# Patient Record
Sex: FEMALE | Race: WHITE | NOT HISPANIC OR LATINO | Employment: OTHER | URBAN - METROPOLITAN AREA
[De-identification: names, ages, dates, MRNs, and addresses within clinical notes are randomized per-mention and may not be internally consistent; named-entity substitution may affect disease eponyms.]

---

## 2023-02-09 ENCOUNTER — LAB (OUTPATIENT)
Dept: LAB | Facility: HOSPITAL | Age: 88
End: 2023-02-09
Payer: MEDICARE

## 2023-02-09 ENCOUNTER — OFFICE VISIT (OUTPATIENT)
Dept: INTERNAL MEDICINE | Facility: CLINIC | Age: 88
End: 2023-02-09
Payer: MEDICARE

## 2023-02-09 VITALS
HEIGHT: 65 IN | BODY MASS INDEX: 23.32 KG/M2 | WEIGHT: 140 LBS | SYSTOLIC BLOOD PRESSURE: 130 MMHG | HEART RATE: 70 BPM | DIASTOLIC BLOOD PRESSURE: 78 MMHG | TEMPERATURE: 96.1 F | OXYGEN SATURATION: 99 %

## 2023-02-09 DIAGNOSIS — R06.02 SHORTNESS OF BREATH: Primary | ICD-10-CM

## 2023-02-09 DIAGNOSIS — R60.0 PEDAL EDEMA: ICD-10-CM

## 2023-02-09 DIAGNOSIS — N39.41 URGE INCONTINENCE OF URINE: ICD-10-CM

## 2023-02-09 DIAGNOSIS — E03.9 ACQUIRED HYPOTHYROIDISM: ICD-10-CM

## 2023-02-09 DIAGNOSIS — H61.21 HEARING LOSS SECONDARY TO CERUMEN IMPACTION, RIGHT: ICD-10-CM

## 2023-02-09 DIAGNOSIS — F03.A0 MILD DEMENTIA WITHOUT BEHAVIORAL DISTURBANCE, PSYCHOTIC DISTURBANCE, MOOD DISTURBANCE, OR ANXIETY, UNSPECIFIED DEMENTIA TYPE: ICD-10-CM

## 2023-02-09 DIAGNOSIS — L98.9 NON-HEALING SKIN LESION OF NOSE: ICD-10-CM

## 2023-02-09 LAB
BASOPHILS # BLD AUTO: 0.06 10*3/MM3 (ref 0–0.2)
BASOPHILS NFR BLD AUTO: 0.7 % (ref 0–1.5)
DEPRECATED RDW RBC AUTO: 49.9 FL (ref 37–54)
EOSINOPHIL # BLD AUTO: 0.16 10*3/MM3 (ref 0–0.4)
EOSINOPHIL NFR BLD AUTO: 1.8 % (ref 0.3–6.2)
ERYTHROCYTE [DISTWIDTH] IN BLOOD BY AUTOMATED COUNT: 14.5 % (ref 12.3–15.4)
HCT VFR BLD AUTO: 35.9 % (ref 34–46.6)
HGB BLD-MCNC: 11.9 G/DL (ref 12–15.9)
IMM GRANULOCYTES # BLD AUTO: 0.06 10*3/MM3 (ref 0–0.05)
IMM GRANULOCYTES NFR BLD AUTO: 0.7 % (ref 0–0.5)
LYMPHOCYTES # BLD AUTO: 1.23 10*3/MM3 (ref 0.7–3.1)
LYMPHOCYTES NFR BLD AUTO: 14 % (ref 19.6–45.3)
MCH RBC QN AUTO: 31.4 PG (ref 26.6–33)
MCHC RBC AUTO-ENTMCNC: 33.1 G/DL (ref 31.5–35.7)
MCV RBC AUTO: 94.7 FL (ref 79–97)
MONOCYTES # BLD AUTO: 0.95 10*3/MM3 (ref 0.1–0.9)
MONOCYTES NFR BLD AUTO: 10.8 % (ref 5–12)
NEUTROPHILS NFR BLD AUTO: 6.3 10*3/MM3 (ref 1.7–7)
NEUTROPHILS NFR BLD AUTO: 72 % (ref 42.7–76)
NRBC BLD AUTO-RTO: 0 /100 WBC (ref 0–0.2)
PLATELET # BLD AUTO: 246 10*3/MM3 (ref 140–450)
PMV BLD AUTO: 10.4 FL (ref 6–12)
RBC # BLD AUTO: 3.79 10*6/MM3 (ref 3.77–5.28)
WBC NRBC COR # BLD: 8.76 10*3/MM3 (ref 3.4–10.8)

## 2023-02-09 PROCEDURE — 86376 MICROSOMAL ANTIBODY EACH: CPT | Performed by: NURSE PRACTITIONER

## 2023-02-09 PROCEDURE — 69209 REMOVE IMPACTED EAR WAX UNI: CPT | Performed by: NURSE PRACTITIONER

## 2023-02-09 PROCEDURE — 99204 OFFICE O/P NEW MOD 45 MIN: CPT | Performed by: NURSE PRACTITIONER

## 2023-02-09 PROCEDURE — 86800 THYROGLOBULIN ANTIBODY: CPT | Performed by: NURSE PRACTITIONER

## 2023-02-09 PROCEDURE — 83880 ASSAY OF NATRIURETIC PEPTIDE: CPT | Performed by: NURSE PRACTITIONER

## 2023-02-09 PROCEDURE — 80053 COMPREHEN METABOLIC PANEL: CPT | Performed by: NURSE PRACTITIONER

## 2023-02-09 PROCEDURE — 84436 ASSAY OF TOTAL THYROXINE: CPT | Performed by: NURSE PRACTITIONER

## 2023-02-09 PROCEDURE — 84443 ASSAY THYROID STIM HORMONE: CPT | Performed by: NURSE PRACTITIONER

## 2023-02-09 PROCEDURE — 85025 COMPLETE CBC W/AUTO DIFF WBC: CPT | Performed by: NURSE PRACTITIONER

## 2023-02-09 PROCEDURE — 36415 COLL VENOUS BLD VENIPUNCTURE: CPT | Performed by: NURSE PRACTITIONER

## 2023-02-09 RX ORDER — CHOLECALCIFEROL (VITAMIN D3) 1250 MCG
CAPSULE ORAL
COMMUNITY
End: 2023-02-09

## 2023-02-09 RX ORDER — LEVOTHYROXINE SODIUM 112 UG/1
112 TABLET ORAL DAILY
Qty: 90 TABLET | Refills: 2 | Status: SHIPPED | OUTPATIENT
Start: 2023-02-09

## 2023-02-09 RX ORDER — MEMANTINE HYDROCHLORIDE 5 MG/1
5 TABLET ORAL 2 TIMES DAILY
Qty: 180 TABLET | Refills: 2 | Status: SHIPPED | OUTPATIENT
Start: 2023-02-09

## 2023-02-09 RX ORDER — LEVOTHYROXINE SODIUM 112 UG/1
112 TABLET ORAL DAILY
COMMUNITY
Start: 2022-11-18 | End: 2023-02-09 | Stop reason: SDUPTHER

## 2023-02-09 RX ORDER — MEMANTINE HYDROCHLORIDE 5 MG/1
5 TABLET ORAL 2 TIMES DAILY
COMMUNITY
End: 2023-02-09 | Stop reason: SDUPTHER

## 2023-02-09 RX ORDER — VERAPAMIL HYDROCHLORIDE 240 MG/1
240 TABLET, FILM COATED, EXTENDED RELEASE ORAL DAILY
COMMUNITY
End: 2023-02-09 | Stop reason: SDUPTHER

## 2023-02-09 RX ORDER — CHOLECALCIFEROL (VITAMIN D3) 125 MCG
5 CAPSULE ORAL 2 TIMES DAILY
COMMUNITY
End: 2023-02-09

## 2023-02-09 RX ORDER — MELATONIN
1000 DAILY
COMMUNITY

## 2023-02-09 RX ORDER — MELATONIN 10 MG
10 CAPSULE ORAL
COMMUNITY

## 2023-02-09 RX ORDER — VERAPAMIL HYDROCHLORIDE 240 MG/1
240 TABLET, FILM COATED, EXTENDED RELEASE ORAL DAILY
Qty: 90 TABLET | Refills: 2 | Status: SHIPPED | OUTPATIENT
Start: 2023-02-09

## 2023-02-09 RX ORDER — OXYBUTYNIN CHLORIDE 10 MG/1
10 TABLET, EXTENDED RELEASE ORAL DAILY
Qty: 30 TABLET | Refills: 5 | Status: SHIPPED | OUTPATIENT
Start: 2023-02-09

## 2023-02-09 RX ORDER — OMEPRAZOLE 20 MG/1
20 CAPSULE, DELAYED RELEASE ORAL DAILY
COMMUNITY
End: 2023-02-09 | Stop reason: SDUPTHER

## 2023-02-09 RX ORDER — OMEPRAZOLE 20 MG/1
20 CAPSULE, DELAYED RELEASE ORAL DAILY
Qty: 90 CAPSULE | Refills: 2 | Status: SHIPPED | OUTPATIENT
Start: 2023-02-09

## 2023-02-09 RX ORDER — SERTRALINE HYDROCHLORIDE 100 MG/1
100 TABLET, FILM COATED ORAL DAILY
Qty: 90 TABLET | Refills: 2 | Status: SHIPPED | OUTPATIENT
Start: 2023-02-09

## 2023-02-09 RX ORDER — SERTRALINE HYDROCHLORIDE 100 MG/1
100 TABLET, FILM COATED ORAL DAILY
COMMUNITY
Start: 2023-01-01 | End: 2023-02-09 | Stop reason: SDUPTHER

## 2023-02-09 RX ORDER — MEMANTINE HYDROCHLORIDE 5 MG/1
5 TABLET ORAL 2 TIMES DAILY
COMMUNITY
Start: 2023-01-30 | End: 2023-02-09 | Stop reason: SDUPTHER

## 2023-02-09 NOTE — PROGRESS NOTES
"Subjective   Chief Complaint   Patient presents with   • Establish Care       Jaqui Matute is a 89 y.o. female here today as a new patient to establish care.  Prior PCP was Dr Xie.  Pt is needing medication refills.   Pt's daughter is with pt.  She states that pt gets a little more \"winded\" when she ambulates.  She has \"sitters\" during the day and they can tell a difference with her breathing when she gets up and moves around.  Unhealing skin lesion on the right side of her nose x1 year.  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  Her daughter also feels like her hearing is decreased.  She feels that she could have a wax buildup.    Urinary leakage.  Has to wear a depends.    Review of Systems   Constitutional: Negative for activity change, appetite change and fatigue.   HENT: Positive for hearing loss. Negative for congestion and ear pain.    Respiratory: Positive for cough and shortness of breath.    Cardiovascular: Negative for chest pain and leg swelling.   Gastrointestinal: Negative for abdominal pain.   Skin: Positive for skin lesions (nose).   Neurological: Positive for memory problem. Negative for dizziness, weakness and confusion.   Psychiatric/Behavioral: Negative for behavioral problems and decreased concentration.       Past Medical History:   Diagnosis Date   • Anemia    • Cancer (HCC)    • Colon polyp    • Dementia (HCC)    • Diverticulosis    • GERD (gastroesophageal reflux disease)    • Headache    • Hyperlipidemia    • Hypertension    • Hypothyroidism      Past Surgical History:   Procedure Laterality Date   • FEMUR FRACTURE SURGERY     • HYSTERECTOMY     • MASTECTOMY Left    • REPLACEMENT TOTAL KNEE     • THYROIDECTOMY     • TOTAL HIP ARTHROPLASTY       Family History   Problem Relation Age of Onset   • Cancer Mother    • Arthritis Mother    • Arthritis Father    • Lung cancer Sister    • Cancer Son    • Diabetes Son      Social History     Tobacco Use   Smoking Status Former   • Types: Cigarettes " "  Smokeless Tobacco Never      Social History     Substance and Sexual Activity   Alcohol Use Not Currently      Current Outpatient Medications on File Prior to Visit   Medication Sig   • cholecalciferol (VITAMIN D3) 25 MCG (1000 UT) tablet Take 1,000 Units by mouth Daily.   • Melatonin 10 MG capsule Take 10 mg by mouth.   • Multiple Vitamins-Minerals (MULTIVITAMIN ADULT EXTRA C PO) multivitamin   • [DISCONTINUED] Cholecalciferol (Vitamin D3) 1.25 MG (08781 UT) capsule Vitamin D3   1000 mg   • [DISCONTINUED] levothyroxine (SYNTHROID, LEVOTHROID) 112 MCG tablet Take 112 mcg by mouth Daily.   • [DISCONTINUED] melatonin 5 MG tablet tablet Take 5 mg by mouth 2 (Two) Times a Day.   • [DISCONTINUED] memantine (NAMENDA) 5 MG tablet Take 5 mg by mouth 2 (Two) Times a Day.   • [DISCONTINUED] memantine (NAMENDA) 5 MG tablet Take 5 mg by mouth 2 (Two) Times a Day.   • [DISCONTINUED] omeprazole (priLOSEC) 20 MG capsule Take 20 mg by mouth Daily.   • [DISCONTINUED] sertraline (ZOLOFT) 100 MG tablet Take 100 mg by mouth Daily.   • [DISCONTINUED] verapamil SR (CALAN-SR) 240 MG CR tablet Take 240 mg by mouth Daily.     No current facility-administered medications on file prior to visit.     No Known Allergies    Objective   Vitals:    02/09/23 1328   BP: 130/78   BP Location: Left arm   Patient Position: Sitting   Pulse: 70   Temp: 96.1 °F (35.6 °C)   SpO2: 99%   Weight: 63.5 kg (140 lb)   Height: 165.1 cm (65\")     Body mass index is 23.3 kg/m².     Physical Exam  Vitals and nursing note reviewed.   HENT:      Head: Normocephalic.   Eyes:      Pupils: Pupils are equal, round, and reactive to light.   Cardiovascular:      Rate and Rhythm: Normal rate and regular rhythm.      Pulses: Normal pulses.      Heart sounds: Normal heart sounds. No murmur heard.  Pulmonary:      Effort: Pulmonary effort is normal. No respiratory distress.      Breath sounds: Normal breath sounds. No wheezing or rhonchi.   Musculoskeletal:      Right lower " le+ Edema present.      Left lower le+ Edema present.   Skin:     General: Skin is warm and dry.      Capillary Refill: Capillary refill takes less than 2 seconds.      Comments: Scabbed lesion noted on the right side of nose, no redness or drainage   Neurological:      General: No focal deficit present.      Mental Status: She is alert and oriented to person, place, and time.      Gait: Gait is intact.   Psychiatric:         Attention and Perception: Attention normal.         Mood and Affect: Mood normal.         Behavior: Behavior normal.     Ear Cerumen Removal    Date/Time: 2023 4:04 PM  Performed by: Amrit Arredondo APRN  Authorized by: Amrit Arredondo APRN   Consent: Verbal consent obtained.  Risks and benefits: risks, benefits and alternatives were discussed  Consent given by: patient  Location details: right ear  Patient tolerance: patient tolerated the procedure well with no immediate complications  Comments: Cerumen was unable to be removed due to it being too hard  Procedure type: irrigation         Assessment & Plan   Problem List Items Addressed This Visit    None  Visit Diagnoses     Shortness of breath    -  Primary    Relevant Orders    CBC w AUTO Differential    Comprehensive Metabolic Panel    XR Chest PA & Lateral    proBNP    Acquired hypothyroidism        Relevant Medications    levothyroxine (SYNTHROID, LEVOTHROID) 112 MCG tablet    Other Relevant Orders    TSH    T4    Thyroid Antibodies    Pedal edema        Urge incontinence of urine        Relevant Medications    oxybutynin XL (Ditropan XL) 10 MG 24 hr tablet    Hearing loss secondary to cerumen impaction, right        Relevant Orders    Ear Cerumen Removal    Non-healing skin lesion of nose        Mild dementia without behavioral disturbance, psychotic disturbance, mood disturbance, or anxiety, unspecified dementia type        Relevant Medications    sertraline (ZOLOFT) 100 MG tablet    memantine (NAMENDA) 5 MG tablet          Labs and CXR for SOB  Daughter does not want pt referred for skin lesion, likely a form of cancer  Try Oxybutynin for urge incontinence  Unable to remove wax, d/w daughter to use debrox daily for 5 days and flush ear with warm water  Does not wish to schedule AWV        Current Outpatient Medications:   •  cholecalciferol (VITAMIN D3) 25 MCG (1000 UT) tablet, Take 1,000 Units by mouth Daily., Disp: , Rfl:   •  levothyroxine (SYNTHROID, LEVOTHROID) 112 MCG tablet, Take 1 tablet by mouth Daily., Disp: 90 tablet, Rfl: 2  •  Melatonin 10 MG capsule, Take 10 mg by mouth., Disp: , Rfl:   •  memantine (NAMENDA) 5 MG tablet, Take 1 tablet by mouth 2 (Two) Times a Day., Disp: 180 tablet, Rfl: 2  •  Multiple Vitamins-Minerals (MULTIVITAMIN ADULT EXTRA C PO), multivitamin, Disp: , Rfl:   •  omeprazole (priLOSEC) 20 MG capsule, Take 1 capsule by mouth Daily., Disp: 90 capsule, Rfl: 2  •  sertraline (ZOLOFT) 100 MG tablet, Take 1 tablet by mouth Daily., Disp: 90 tablet, Rfl: 2  •  verapamil SR (CALAN-SR) 240 MG CR tablet, Take 1 tablet by mouth Daily., Disp: 90 tablet, Rfl: 2  •  oxybutynin XL (Ditropan XL) 10 MG 24 hr tablet, Take 1 tablet by mouth Daily., Disp: 30 tablet, Rfl: 5       Plan of care reviewed with the patient at the conclusion of today's visit.  Education was provided regarding diagnosis, management, and any prescribed or recommended OTC medications.  Patient verbalized understanding of and agreement with management plan.      Return in about 1 year (around 2/9/2024) for HTN, Hypothyroidism.         MAGI Grady

## 2023-02-10 LAB
ALBUMIN SERPL-MCNC: 4.3 G/DL (ref 3.5–5.2)
ALBUMIN/GLOB SERPL: 1.7 G/DL
ALP SERPL-CCNC: 119 U/L (ref 39–117)
ALT SERPL W P-5'-P-CCNC: 13 U/L (ref 1–33)
ANION GAP SERPL CALCULATED.3IONS-SCNC: 6 MMOL/L (ref 5–15)
AST SERPL-CCNC: 19 U/L (ref 1–32)
BILIRUB SERPL-MCNC: 0.3 MG/DL (ref 0–1.2)
BUN SERPL-MCNC: 15 MG/DL (ref 8–23)
BUN/CREAT SERPL: 16.9 (ref 7–25)
CALCIUM SPEC-SCNC: 8.8 MG/DL (ref 8.2–9.6)
CHLORIDE SERPL-SCNC: 99 MMOL/L (ref 98–107)
CO2 SERPL-SCNC: 29 MMOL/L (ref 22–29)
CREAT SERPL-MCNC: 0.89 MG/DL (ref 0.57–1)
EGFRCR SERPLBLD CKD-EPI 2021: 61.3 ML/MIN/1.73
GLOBULIN UR ELPH-MCNC: 2.5 GM/DL
GLUCOSE SERPL-MCNC: 88 MG/DL (ref 65–99)
NT-PROBNP SERPL-MCNC: 363 PG/ML (ref 0–1800)
POTASSIUM SERPL-SCNC: 4.4 MMOL/L (ref 3.5–5.2)
PROT SERPL-MCNC: 6.8 G/DL (ref 6–8.5)
SODIUM SERPL-SCNC: 134 MMOL/L (ref 136–145)
T4 SERPL-MCNC: 7.97 MCG/DL (ref 4.5–11.7)
TSH SERPL DL<=0.05 MIU/L-ACNC: 6.3 UIU/ML (ref 0.27–4.2)

## 2023-02-13 LAB
THYROGLOB AB SERPL-ACNC: <1 IU/ML (ref 0–0.9)
THYROPEROXIDASE AB SERPL-ACNC: 10 IU/ML (ref 0–34)

## 2023-02-21 ENCOUNTER — HOSPITAL ENCOUNTER (OUTPATIENT)
Dept: GENERAL RADIOLOGY | Facility: HOSPITAL | Age: 88
Discharge: HOME | End: 2023-02-21
Admitting: NURSE PRACTITIONER
Payer: MEDICARE

## 2023-02-21 DIAGNOSIS — R06.02 SHORTNESS OF BREATH: ICD-10-CM

## 2023-02-21 PROCEDURE — 71046 X-RAY EXAM CHEST 2 VIEWS: CPT

## 2023-02-22 DIAGNOSIS — R19.02 LEFT UPPER QUADRANT ABDOMINAL MASS: Primary | ICD-10-CM

## 2023-02-22 DIAGNOSIS — R93.89 ABNORMAL CHEST X-RAY: ICD-10-CM

## 2023-02-24 ENCOUNTER — HOSPITAL ENCOUNTER (OUTPATIENT)
Dept: CT IMAGING | Facility: HOSPITAL | Age: 88
Discharge: HOME | End: 2023-02-24
Admitting: NURSE PRACTITIONER
Payer: MEDICARE

## 2023-02-24 DIAGNOSIS — R93.89 ABNORMAL CHEST X-RAY: ICD-10-CM

## 2023-02-24 DIAGNOSIS — R19.02 LEFT UPPER QUADRANT ABDOMINAL MASS: ICD-10-CM

## 2023-02-24 PROCEDURE — 74177 CT ABD & PELVIS W/CONTRAST: CPT

## 2023-02-24 PROCEDURE — 25510000001 IOPAMIDOL 61 % SOLUTION: Performed by: NURSE PRACTITIONER

## 2023-02-24 RX ADMIN — IOPAMIDOL 80 ML: 612 INJECTION, SOLUTION INTRAVENOUS at 10:48

## 2023-02-27 ENCOUNTER — TELEPHONE (OUTPATIENT)
Dept: INTERNAL MEDICINE | Facility: CLINIC | Age: 88
End: 2023-02-27

## 2023-02-27 NOTE — TELEPHONE ENCOUNTER
Caller: ZULMA DE LA GARZA    Relationship: Emergency Contact    Best call back number: [phone]    Caller requesting test results: ZULMA    What test was performed: CT SCAN    When was the test performed: 2/24/2023    Additional notes: PLEASE CALL PATIENT'S DAUGHTER TO DISCUSS THESE RESULTS AS SOON AS POSSIBLE. SHE STATED THAT IF SHE DOESN'T ANSWER, SHE IS FINE WITH GETTING A DETAILED VOICE MAIL.

## 2023-03-02 ENCOUNTER — TELEPHONE (OUTPATIENT)
Dept: INTERNAL MEDICINE | Facility: CLINIC | Age: 88
End: 2023-03-02

## 2023-03-02 DIAGNOSIS — M25.662 DECREASED RANGE OF MOTION (ROM) OF LEFT KNEE: Primary | ICD-10-CM

## 2023-03-02 NOTE — TELEPHONE ENCOUNTER
Caller: ZULMA DE LA GARZA    Relationship: Emergency Contact    Best call back number: [phone]    What is the medical concern/diagnosis: LEFT LEG ISSUES POST SURGERY ALMOST 2 YEARS AGO    What specialty or service is being requested: PHYSICAL THERAPY    What is the office location: [place]    Any additional details: PATIENT IS NOT STRAIGHTENING OUT LEFT LEG SINCE ARTIFICIAL KNEE SURGERY ALMOST 2 YEARS AGO AND ZULMA WOULD LIKE TO GET PHYSICAL THERAPY FOR PATIENT. PLEASE ADVISE

## 2023-03-13 ENCOUNTER — TREATMENT (OUTPATIENT)
Dept: PHYSICAL THERAPY | Facility: CLINIC | Age: 88
End: 2023-03-13
Payer: MEDICARE

## 2023-03-13 DIAGNOSIS — Z74.09 IMPAIRED FUNCTIONAL MOBILITY, BALANCE, GAIT, AND ENDURANCE: ICD-10-CM

## 2023-03-13 DIAGNOSIS — M25.562 CHRONIC PAIN OF LEFT KNEE: Primary | ICD-10-CM

## 2023-03-13 DIAGNOSIS — G89.29 CHRONIC PAIN OF LEFT KNEE: Primary | ICD-10-CM

## 2023-03-13 PROCEDURE — 97530 THERAPEUTIC ACTIVITIES: CPT | Performed by: PHYSICAL THERAPIST

## 2023-03-13 PROCEDURE — 97162 PT EVAL MOD COMPLEX 30 MIN: CPT | Performed by: PHYSICAL THERAPIST

## 2023-03-13 PROCEDURE — 97110 THERAPEUTIC EXERCISES: CPT | Performed by: PHYSICAL THERAPIST

## 2023-03-13 NOTE — PROGRESS NOTES
Physical Therapy Initial Evaluation and Plan of Care    [address]  [place] [address]    Patient: Jaqui Matute   : 1931  Diagnosis/ICD-10 Code:  Chronic pain of left knee [M25.562, G89.29]  Referring practitioner: MAGI Grady  Date of Initial Visit: 3/13/2023  Today's Date: 3/14/2023  Patient seen for 1 sessions         Visit Diagnoses:    ICD-10-CM ICD-9-CM   1. Chronic pain of left knee  M25.562 719.46    G89.29 338.29   2. Impaired functional mobility, balance, gait, and endurance  Z74.09 V49.89       Subjective Questionnaire: LEFS: 25      Subjective Evaluation    History of Present Illness  Date of onset: 3/14/2022  Mechanism of injury: Pt states she had left TKA performed about 2 years ago at [facility], Dr. Summers. Denies any complications, reports she was able to regain her ROM. States she hasn't been as active recently, noted decline in knee mobility. States she cannot extend her knee into full extension. States uses rolling walker for ambulation at home. Indep with dressing, bathing. Chair in tub, has assistance with transfer.   Denies any buckling, denies falls. Uses icy hot as needed. Pt has dementia, has son present to assist with evaluation. Pt reports she uses her recumbent bike daily, doing about 200 revolutions. Primary c/o limited ROM, secondary c/o knee joint pain with transfers and limited walking distance.     Subjective comment: Pt presents with c/o decreased L knee AROM.   Patient Occupation: Retired; has supportive family. Assistance at home from full time sitter. Quality of life: good    Pain  Current pain ratin  At best pain ratin  At worst pain ratin  Quality: discomfort, dull ache, pressure and tight  Relieving factors: change in position and rest  Aggravating factors: movement, standing, ambulation, prolonged positioning and squatting  Progression: worsening    Social Support  Lives in: one-story house  Lives with: sitter.    Hand  dominance: right    Treatments  No previous or current treatments  Patient Goals  Patient goals for therapy: decreased pain, increased motion and improved balance             Objective          Observations     Additional Knee Observation Details  L TKA scar    Tenderness   Left Knee   Tenderness in the medial joint line.     Additional Tenderness Details  Mild TTP along medial joint.     Neurological Testing     Sensation     Knee   Left Knee   Intact: light touch    Right Knee   Intact: light touch     Active Range of Motion   Left Knee   Flexion: 80 (105 with pain) degrees with pain  Extension: 15 degrees with pain    Right Knee   Flexion: 120 degrees   Extension: 0 degrees     Patellar Mobility   Left Knee Hypomobile in the left medial, left lateral, left superior and left inferior patellar tendon(s).     Right Knee Patellar tendons within functional limits include the medial, lateral, superior and inferior.     Additional Patellar Mobility Details  Pain with attempted patellar mobs L knee    Strength/Myotome Testing     Left Knee   Flexion: 4-  Extension: 3-  Quadriceps contraction: fair    Right Knee   Flexion: 4+  Extension: 4+  Quadriceps contraction: good    Additional Strength Details  Sit-stand: indep with use of BUE  Sit-chair stand pivot transfer: PT CGA with use of RW  Sit-supine-sit- Maryjo    Gait: PT CGA with RW    Ambulation     Comments   Pt ambulated with RW, increased forward flexed trunk, demonstrates step through gait pattern, limited TKE on LLE, maintained about 15 degrees flexion in standing position. Decreased push off bilaterally, increase risk for falls.     Did not assess stairs today.           Assessment & Plan     Assessment  Impairments: abnormal gait, abnormal or restricted ROM, activity intolerance, impaired balance, impaired physical strength, lacks appropriate home exercise program and pain with function  Functional Limitations: walking, uncomfortable because of pain, moving in bed  and sitting  Assessment details: Pt is a 89 yo female referred to PT for left knee pain. She has h/o left TKA performed about 2 years ago, recently noted decline in overall knee AROM. Pt demonstrates impaired gait mechanics with RW, decreased L AROM/PROM, and decreased strength LLE. Recommend skilled PT to improve functional mobility, decrease pain, and decrease overall fall risk to maintain current independence level with ADLs.     Barriers to therapy: chronic pain, dementia  Prognosis: good    Goals  Plan Goals: Short term goals (2-3 weeks)  1. Patient to report left knee pain less than or equal to 2/10.  2. Patient to demonstrate left knee flexion to at least 100 degrees.  3. Patient to demonstrate knee extension to at least 0-10 degrees.  4. Patient to be compliant with initial HEP.  5. Patient/family will be independent with patellar and scar tissue mobilization techniques.    Long Term goals (4-6 weeks)  1. Patient to demonstrate left knee flexion to at least 110 degrees.  2. Patient to demonstrate left knee extension to at least 0-5 degrees  3. Patient will demonstrate independent HEP progressed for closed chain strength, proprioception, balance and agility with minimal or no compensations or exacerbations  4. Patient to report pain intermittently equal to zero.  5. Patient will demonstrate improved functional outcome measure by 10 points        Plan  Therapy options: will be seen for skilled therapy services  Planned modality interventions: cryotherapy, TENS and thermotherapy (hydrocollator packs)  Planned therapy interventions: balance/weight-bearing training, body mechanics training, flexibility, functional ROM exercises, gait training, home exercise program, joint mobilization, manual therapy, neuromuscular re-education, postural training, soft tissue mobilization, spinal/joint mobilization, strengthening, stretching and therapeutic activities  Frequency: 2x week  Duration in visits: 12  Duration in  weeks: 6  Treatment plan discussed with: patient and caregiver  Plan details: HEP: heel slides, quad sets, LE stretching. Educated on RW mechanics to decrease fall risk, transfer training. Assess response from exercises.         History # of Personal factors and/or comorbidities: MODERATE (1-2)  Examination of Body System(s): # of elements: MODERATE (3)  Clinical Presentation: STABLE   Clinical Decision Making: MODERATE      Timed:  Manual Therapy:    0     mins  90853;  Therapeutic Exercise:    13     mins  62724;     Neuromuscular Libia:    0    mins  00406;    Therapeutic Activity:     15     mins  43906;     Gait Trainin     mins  17459;     Ultrasound:     0     mins  44248;    Ionto   __0_  mins  95465;    Un-Timed:  Electrical Stimulation:    0     mins  03945 ( );  Dry Needling     0     mins self-pay  Traction  _ 0_   mins  00499  Low Eval  __0_ mins  30728  Mod Eval  _30__ mins  05466  High Eval  _0__ mins   46791    Timed Treatment:   28   mins   Total Treatment:     58   mins    PT SIGNATURE: Corinne E. Perkins, PT   [place] License: [id number]      Certification Period: 3/14/2023 thru 2023  I certify that the therapy services are furnished while this patient is under my care.  The services outlined above are required by this patient, and will be reviewed every 90 days.        Physician Signature: _______________________________________________________________________________________________________     PHYSICIAN: Amrit Arredondo APRN   NPI: [id number]     DATE:                                             Please sign and return via fax to [email or web address] . Thank you, [facility].

## 2023-03-22 ENCOUNTER — TREATMENT (OUTPATIENT)
Dept: PHYSICAL THERAPY | Facility: CLINIC | Age: 88
End: 2023-03-22
Payer: MEDICARE

## 2023-03-22 DIAGNOSIS — G89.29 CHRONIC PAIN OF LEFT KNEE: Primary | ICD-10-CM

## 2023-03-22 DIAGNOSIS — M25.562 CHRONIC PAIN OF LEFT KNEE: Primary | ICD-10-CM

## 2023-03-22 DIAGNOSIS — Z74.09 IMPAIRED FUNCTIONAL MOBILITY, BALANCE, GAIT, AND ENDURANCE: ICD-10-CM

## 2023-03-22 PROCEDURE — 97530 THERAPEUTIC ACTIVITIES: CPT | Performed by: PHYSICAL THERAPIST

## 2023-03-22 PROCEDURE — 97112 NEUROMUSCULAR REEDUCATION: CPT | Performed by: PHYSICAL THERAPIST

## 2023-03-22 PROCEDURE — 97110 THERAPEUTIC EXERCISES: CPT | Performed by: PHYSICAL THERAPIST

## 2023-03-22 NOTE — PROGRESS NOTES
Physical Therapy Daily Treatment Note    [address] [facility], [address]  [place] [address]    Patient: Jaqui Matute   : 1931  Referring practitioner: MAGI Grady  Date of Initial Visit: Type: THERAPY  Noted: 3/13/2023  Today's Date: 3/22/2023  Patient seen for 2 sessions       Visit Diagnoses:    ICD-10-CM ICD-9-CM   1. Chronic pain of left knee  M25.562 719.46    G89.29 338.29   2. Impaired functional mobility, balance, gait, and endurance  Z74.09 V49.89       Subjective   Patient reports no change of knee pain, states she just returned from [place] a few days ago, had a long car ride, stiff. States  her pain level is 2/10 upon arrival.      Objective   See Exercise, Manual, and Modality Logs for complete treatment.       Assessment/Plan  Patient tolerated initial exercises well, requested to have seated exercises vs hook lying exercises for HEP. No buckling noted during standing exercises. Most limited by fatigue of LLE. Educated on progressed HEP, gave written copy and YTB for home. Assess response from exercise, progress in clinic next visit to include 3 way hip, NMRE exercises with BUE support, and functional transfer training.     Timed:         Manual Therapy:    0     mins  85568;     Therapeutic Exercise:    15     mins  92837;     Neuromuscular Libia:    10    mins  22683;    Therapeutic Activity:     17     mins  15131;     Gait Trainin     mins  81900;     Ultrasound:     0     mins  06824;    Ionto                               0    mins   35743  Self Care                       0     mins   98825  Canalith Repos    0     mins 29644      Un-Timed:  Electrical Stimulation:    0     mins  37810 ( );  Dry Needling     0     mins self-pay  Traction     0     mins 33001      Timed Treatment:   42   mins   Total Treatment:     42   mins    Corinne E. Perkins, PT  [place] License: [id number]

## 2023-03-28 ENCOUNTER — TREATMENT (OUTPATIENT)
Dept: PHYSICAL THERAPY | Facility: CLINIC | Age: 88
End: 2023-03-28
Payer: MEDICARE

## 2023-03-28 DIAGNOSIS — M25.562 CHRONIC PAIN OF LEFT KNEE: Primary | ICD-10-CM

## 2023-03-28 DIAGNOSIS — Z74.09 IMPAIRED FUNCTIONAL MOBILITY, BALANCE, GAIT, AND ENDURANCE: ICD-10-CM

## 2023-03-28 DIAGNOSIS — G89.29 CHRONIC PAIN OF LEFT KNEE: Primary | ICD-10-CM

## 2023-03-28 PROCEDURE — 97530 THERAPEUTIC ACTIVITIES: CPT | Performed by: PHYSICAL THERAPIST

## 2023-03-28 PROCEDURE — 97110 THERAPEUTIC EXERCISES: CPT | Performed by: PHYSICAL THERAPIST

## 2023-03-28 PROCEDURE — 97112 NEUROMUSCULAR REEDUCATION: CPT | Performed by: PHYSICAL THERAPIST

## 2023-03-28 NOTE — PROGRESS NOTES
Physical Therapy Daily Treatment Note    [address] [facility], [address]  [place] [address]    Patient: Jaqui Matute   : 1931  Referring practitioner: MAGI Grady  Date of Initial Visit: Type: THERAPY  Noted: 3/13/2023  Today's Date: 3/28/2023  Patient seen for 3 sessions       Visit Diagnoses:    ICD-10-CM ICD-9-CM   1. Chronic pain of left knee  M25.562 719.46    G89.29 338.29   2. Impaired functional mobility, balance, gait, and endurance  Z74.09 V49.89       Subjective   Patient reports she felt okay after last visit. States her overall knee pain/stiffness feels the same but feels she is moving a little better. Her son states he thinks she is moving LE better, states he has attempted to help with compliance with HEP along with getting sitters on board due to dementia.  States  her pain level is 5-6/10 upon arrival.      Objective   See Exercise, Manual, and Modality Logs for complete treatment.       Assessment/Plan   Patient demonstrates improved stride length with ambulation, using RW; however upon fatigue, increased cueing required for improved upright posture, to stay inside RW, and slow down turns. 2 seated rest breaks required during standing functional mobility exercises in // bars due to BLE fatigue. One episode of RLE buckling noted with forward walking in parallel bars. Mild increase in pain with sit-stand transfers, but improved eccentric lowering control.       Timed:         Manual Therapy:    0     mins  02919;     Therapeutic Exercise:    23     mins  31438;     Neuromuscular Libia:    13    mins  33011;    Therapeutic Activity:     10     mins  73870;     Gait Trainin     mins  48686;     Ultrasound:     0     mins  16798;    Ionto                               0    mins   20704  Self Care                       0     mins   63140  Canalith Repos    0     mins 67134      Un-Timed:  Electrical Stimulation:    0     mins  72165 ( );  Dry Needling     0     mins  self-pay  Traction     0     mins 23835      Timed Treatment:   46   mins   Total Treatment:     46   mins    Corinne E. Perkins, PT  [place] License: [id number]

## 2023-04-04 ENCOUNTER — TREATMENT (OUTPATIENT)
Dept: PHYSICAL THERAPY | Facility: CLINIC | Age: 88
End: 2023-04-04
Payer: MEDICARE

## 2023-04-04 DIAGNOSIS — G89.29 CHRONIC PAIN OF LEFT KNEE: Primary | ICD-10-CM

## 2023-04-04 DIAGNOSIS — Z74.09 IMPAIRED FUNCTIONAL MOBILITY, BALANCE, GAIT, AND ENDURANCE: ICD-10-CM

## 2023-04-04 DIAGNOSIS — M25.562 CHRONIC PAIN OF LEFT KNEE: Primary | ICD-10-CM

## 2023-04-04 PROCEDURE — 97530 THERAPEUTIC ACTIVITIES: CPT | Performed by: PHYSICAL THERAPIST

## 2023-04-04 PROCEDURE — 97112 NEUROMUSCULAR REEDUCATION: CPT | Performed by: PHYSICAL THERAPIST

## 2023-04-04 PROCEDURE — 97110 THERAPEUTIC EXERCISES: CPT | Performed by: PHYSICAL THERAPIST

## 2023-04-04 NOTE — PROGRESS NOTES
Physical Therapy Daily Treatment Note    [address] [facility], [address]  [place] [address]    Patient: Jaqui Matute   : 1931  Referring practitioner: MAGI Grady  Date of Initial Visit: Type: THERAPY  Noted: 3/13/2023  Today's Date: 2023  Patient seen for 4 sessions       Visit Diagnoses:    ICD-10-CM ICD-9-CM   1. Chronic pain of left knee  M25.562 719.46    G89.29 338.29   2. Impaired functional mobility, balance, gait, and endurance  Z74.09 V49.89       Subjective   Patient reports no change of knee pain, states she hasn't attempted any new activity. Pt's son reports she has been doing her HEP more. Denies any falls. States her pain level is mild, 2-3/10 upon arrival.      Objective   See Exercise, Manual, and Modality Logs for complete treatment.       Assessment/Plan  Pt demonstrates improved step through gait pattern, longer stride with fewer verbal cues for upright posture and faster speed with walking on level surface in clinic. Pt ambulated 52 ft with RW prior to seated rest break. Increased time standing in parallel bars today, required 2 seated rest breaks due to LLE fatigue. No buckling or giving way noted of LLE. Progress standing balance exercises next visit in parallel bars, sit-stand transfers, and endurance with walking distance as tolerated.       Timed:         Manual Therapy:    0     mins  87083;     Therapeutic Exercise:    20     mins  11018;     Neuromuscular Libia:    8    mins  13092;    Therapeutic Activity:     10     mins  22285;     Gait Trainin     mins  67834;     Ultrasound:     0     mins  71172;    Ionto                               0    mins   57887  Self Care                       0     mins   94408  Canalith Repos    0     mins 53093      Un-Timed:  Electrical Stimulation:    0     mins  67581 ( );  Dry Needling     0     mins self-pay  Traction     0     mins 18332      Timed Treatment:   38   mins   Total Treatment:     38    mins    Corinne E. Perkins, PT  [place] License: [id number]

## 2023-04-05 ENCOUNTER — TREATMENT (OUTPATIENT)
Dept: PHYSICAL THERAPY | Facility: CLINIC | Age: 88
End: 2023-04-05
Payer: MEDICARE

## 2023-04-05 DIAGNOSIS — G89.29 CHRONIC PAIN OF LEFT KNEE: Primary | ICD-10-CM

## 2023-04-05 DIAGNOSIS — M25.562 CHRONIC PAIN OF LEFT KNEE: Primary | ICD-10-CM

## 2023-04-05 DIAGNOSIS — Z74.09 IMPAIRED FUNCTIONAL MOBILITY, BALANCE, GAIT, AND ENDURANCE: ICD-10-CM

## 2023-04-05 PROCEDURE — 97530 THERAPEUTIC ACTIVITIES: CPT | Performed by: PHYSICAL THERAPIST

## 2023-04-05 PROCEDURE — 97110 THERAPEUTIC EXERCISES: CPT | Performed by: PHYSICAL THERAPIST

## 2023-04-05 PROCEDURE — 97112 NEUROMUSCULAR REEDUCATION: CPT | Performed by: PHYSICAL THERAPIST

## 2023-04-05 NOTE — PROGRESS NOTES
Physical Therapy Daily Treatment Note    [address] [facility], [address]  [place] [address]    Patient: Jaqui Matute   : 1931  Referring practitioner: MAGI Gardy  Date of Initial Visit: Type: THERAPY  Noted: 3/13/2023  Today's Date: 2023  Patient seen for 5 sessions       Visit Diagnoses:    ICD-10-CM ICD-9-CM   1. Chronic pain of left knee  M25.562 719.46    G89.29 338.29   2. Impaired functional mobility, balance, gait, and endurance  Z74.09 V49.89       Subjective   Patient reports she has some soreness from yesterday, tired on arrival. States  her pain level is 5/10 upon arrival.      Objective   See Exercise, Manual, and Modality Logs for complete treatment.       Assessment/Plan   Patient increased total time/endurance on Nustep, mild BLE fatigue noted at 5 minutes. Held further progression of standing therex today secondary to increased fatigue overall. Increase resistance of HEP LE exercises to include RTB next visit as tolerated.       Timed:         Manual Therapy:    9     mins  52360;     Therapeutic Exercise:    12     mins  05608;     Neuromuscular Libia:    14    mins  35992;    Therapeutic Activity:     10     mins  55486;     Gait Trainin     mins  79309;     Ultrasound:     0     mins  76959;    Ionto                               0    mins   90630  Self Care                       0     mins   48881  Canalith Repos    0     mins 78904      Un-Timed:  Electrical Stimulation:    0     mins  71679 ( );  Dry Needling     0     mins self-pay  Traction     0     mins 13499      Timed Treatment:   45   mins   Total Treatment:     45   mins    Corinne E. Perkins, PT  [place] License: [id number]

## 2023-04-11 ENCOUNTER — TREATMENT (OUTPATIENT)
Dept: PHYSICAL THERAPY | Facility: CLINIC | Age: 88
End: 2023-04-11
Payer: MEDICARE

## 2023-04-11 DIAGNOSIS — M25.562 CHRONIC PAIN OF LEFT KNEE: Primary | ICD-10-CM

## 2023-04-11 DIAGNOSIS — Z74.09 IMPAIRED FUNCTIONAL MOBILITY, BALANCE, GAIT, AND ENDURANCE: ICD-10-CM

## 2023-04-11 DIAGNOSIS — G89.29 CHRONIC PAIN OF LEFT KNEE: Primary | ICD-10-CM

## 2023-04-11 PROCEDURE — 97110 THERAPEUTIC EXERCISES: CPT | Performed by: PHYSICAL THERAPIST

## 2023-04-11 PROCEDURE — 97530 THERAPEUTIC ACTIVITIES: CPT | Performed by: PHYSICAL THERAPIST

## 2023-04-11 NOTE — PROGRESS NOTES
Physical Therapy Re Certification Of Plan of Care    [address] [facility] [address]  [place] [address]    Patient: Jaqui Matute   : 1931  Diagnosis/ICD-10 Code:  Chronic pain of left knee [M25.562, G89.29]  Referring practitioner: MAGI Grady  Date of Initial Visit: 2023  Today's Date: 2023  Patient seen for 6 sessions         Visit Diagnoses:    ICD-10-CM ICD-9-CM   1. Chronic pain of left knee  M25.562 719.46    G89.29 338.29   2. Impaired functional mobility, balance, gait, and endurance  Z74.09 V49.89           Subjective Questionnaire: LEFS: 15  Clinical Progress: improved  Home Program Compliance: Yes  Treatment has included: therapeutic exercise, neuromuscular re-education, manual therapy and therapeutic activity      Subjective   Jaqui Matute reports: she is feeling well on arrival. Her son states he can tell she is improving and walking better at home. States they have been working on exercises, performing with sitter and siblings.     Objective          Observations     Additional Knee Observation Details  L TKA scar    Tenderness   Left Knee   Tenderness in the fibular head, lateral joint line and medial joint line.     Additional Tenderness Details  Mild TTP along medial joint.     Neurological Testing     Sensation     Knee   Left Knee   Intact: light touch    Right Knee   Intact: light touch     Active Range of Motion   Left Knee   Flexion: 110 (105 with pain) degrees with pain  Extension: 15 degrees with pain    Right Knee   Flexion: 120 degrees   Extension: 0 degrees     Patellar Mobility   Left Knee Hypomobile in the left medial, left lateral, left superior and left inferior patellar tendon(s).     Right Knee Patellar tendons within functional limits include the medial, lateral, superior and inferior.     Additional Patellar Mobility Details  Pain with attempted patellar mobs L knee    Strength/Myotome Testing     Left Knee   Flexion: 4-  Extension: 4- (in limited AROM,  strength 4-/5)  Quadriceps contraction: fair    Right Knee   Flexion: 4+  Extension: 4+  Quadriceps contraction: good    Additional Strength Details  Sit-stand: indep with use of BUE  Sit-chair stand pivot transfer: PT SBA with use of RW  Sit-supine-sit- Maryjo    Gait: PT SBA with RW    Ambulation     Comments   Pt ambulated with RW, increased forward flexed trunk, demonstrates step through gait pattern, limited TKE on LLE, maintained about 15 degrees flexion in standing position. Improved gait speed on level surface.     Did not assess stairs today.           Assessment & Plan     Assessment  Impairments: abnormal gait, abnormal or restricted ROM, activity intolerance, impaired balance, impaired physical strength and pain with function  Functional Limitations: walking, uncomfortable because of pain, moving in bed and sitting  Assessment details: Reassessment completed today in clinic for 89 yo female referred to PT for left knee pain. She has h/o left TKA performed about 2 years ago, recently noted decline in overall knee AROM. Pt demonstrates mild improvements in L knee AROM flexion prior to onset of pain and demonstrates improved gait speed with RW. She continues to have weakness of LLE; however seems compliant with current HEP. She is at higher fall risk, has tendency to walk with walker too far away from her. Recommend continued skilled PT to improve functional mobility, decrease pain, and decrease overall fall risk to maintain current independence level with ADLs.     Barriers to therapy: chronic pain, dementia  Prognosis: good    Goals  Plan Goals: Short term goals (2-3 weeks)  1. Patient to report left knee pain less than or equal to 2/10. MET  2. Patient to demonstrate left knee flexion to at least 100 degrees. MET  3. Patient to demonstrate knee extension to at least 0-10 degrees. MET  4. Patient to be compliant with initial HEP. ONGOING  5. Patient/family will be independent with patellar and scar tissue  mobilization techniques. MET    Long Term goals (4-6 weeks)  1. Patient to demonstrate left knee flexion to at least 110 degrees. MET  2. Patient to demonstrate left knee extension to at least 0-5 degrees. ONGOING  3. Patient will demonstrate independent HEP progressed for closed chain strength, proprioception, balance and agility with minimal or no compensations or exacerbations. ONGOING  4. Patient to report pain intermittently equal to zero. ONGOING  5. Patient will demonstrate improved functional outcome measure by 10 points. ONGOING        Plan  Therapy options: will be seen for skilled therapy services  Planned modality interventions: cryotherapy, TENS and thermotherapy (hydrocollator packs)  Planned therapy interventions: balance/weight-bearing training, body mechanics training, flexibility, functional ROM exercises, gait training, home exercise program, joint mobilization, manual therapy, neuromuscular re-education, postural training, soft tissue mobilization, spinal/joint mobilization, strengthening, stretching and therapeutic activities  Frequency: 2x week  Duration in visits: 8  Duration in weeks: 4  Treatment plan discussed with: patient and caregiver           Recommendations: Continue as planned  Timeframe: 1 month  Prognosis to achieve goals: good      Timed:         Manual Therapy:    0     mins  29828;     Therapeutic Exercise:    31     mins  20210;     Neuromuscular Libia:    0    mins  66144;    Therapeutic Activity:     10     mins  79330;     Gait Trainin     mins  23479;     Ultrasound:     0     mins  36097;    Ionto                               0    mins   57865  Self Care                       0     mins   86843  Canalith Repos    0     mins 20554      Un-Timed:  Electrical Stimulation:    0     mins  79459 ( );  Dry Needling     0     mins self-pay  Traction     0     mins 21567  Re-Eval                           0    mins  59613      Timed Treatment:   41   mins   Total  Treatment:     41   mins          PT: Corinne E. Perkins, PT     [place] License:  [id number]    Electronically signed by Corinne E. Perkins, PT, 04/11/23, 2:45 PM EDT    Certification Period: 4/11/2023 thru 7/9/2023  I certify that the therapy services are furnished while this patient is under my care.  The services outlined above are required by this patient, and will be reviewed every 90 days.         Physician Signature:__________________________________________________    PHYSICIAN: Amrit Arredondo APRN   NPI: [id number]     DATE:     Please sign and return via fax to [email or web address] . Thank you, [facility]

## 2023-04-13 ENCOUNTER — TREATMENT (OUTPATIENT)
Dept: PHYSICAL THERAPY | Facility: CLINIC | Age: 88
End: 2023-04-13
Payer: MEDICARE

## 2023-04-13 DIAGNOSIS — G89.29 CHRONIC PAIN OF LEFT KNEE: Primary | ICD-10-CM

## 2023-04-13 DIAGNOSIS — Z74.09 IMPAIRED FUNCTIONAL MOBILITY, BALANCE, GAIT, AND ENDURANCE: ICD-10-CM

## 2023-04-13 DIAGNOSIS — M25.562 CHRONIC PAIN OF LEFT KNEE: Primary | ICD-10-CM

## 2023-04-13 PROCEDURE — 97110 THERAPEUTIC EXERCISES: CPT | Performed by: PHYSICAL THERAPIST

## 2023-04-13 PROCEDURE — 97530 THERAPEUTIC ACTIVITIES: CPT | Performed by: PHYSICAL THERAPIST

## 2023-04-13 NOTE — PROGRESS NOTES
Physical Therapy Daily Treatment Note    [address] [facility], [address]  [place] [address]    Patient: Jaqui Matute   : 1931  Referring practitioner: MAGI Grady  Date of Initial Visit: Type: THERAPY  Noted: 3/13/2023  Today's Date: 2023  Patient seen for 7 sessions       Visit Diagnoses:    ICD-10-CM ICD-9-CM   1. Chronic pain of left knee  M25.562 719.46    G89.29 338.29   2. Impaired functional mobility, balance, gait, and endurance  Z74.09 V49.89       Subjective   Patient reports knee feels okay, mild medial knee pain. States  her pain level is 5/10 upon arrival.      Objective   See Exercise, Manual, and Modality Logs for complete treatment.       Assessment/Plan   Pt able to ambulate 2 large laps in clinic, one seated rest break in between laps due to BLE fatigue. Improved upright posture and stride length noted prior to fatigue. Pt able to vary gait speed on level surface. Upon fatigue, narrow SUE noted with LLE crossing midline intermittently. TTP medial knee joint, pes anserine region. Added RTB to HEP.       Timed:         Manual Therapy:    0     mins  01574;     Therapeutic Exercise:    31     mins  03634;     Neuromuscular Libia:    0    mins  09774;    Therapeutic Activity:     10     mins  58324;     Gait Trainin     mins  41743;     Ultrasound:     0     mins  84947;    Ionto                               0    mins   19476  Self Care                       0     mins   30650  Canalith Repos    0     mins 59972      Un-Timed:  Electrical Stimulation:    0     mins  00002 ( );  Dry Needling     0     mins self-pay  Traction     0     mins 86461      Timed Treatment:   41   mins   Total Treatment:     41   mins    Corinne E. Perkins, PT  [place] License: [id number]

## 2023-04-18 ENCOUNTER — TREATMENT (OUTPATIENT)
Dept: PHYSICAL THERAPY | Facility: CLINIC | Age: 88
End: 2023-04-18
Payer: MEDICARE

## 2023-04-18 DIAGNOSIS — Z74.09 IMPAIRED FUNCTIONAL MOBILITY, BALANCE, GAIT, AND ENDURANCE: ICD-10-CM

## 2023-04-18 DIAGNOSIS — M25.562 CHRONIC PAIN OF LEFT KNEE: Primary | ICD-10-CM

## 2023-04-18 DIAGNOSIS — G89.29 CHRONIC PAIN OF LEFT KNEE: Primary | ICD-10-CM

## 2023-04-18 PROCEDURE — 97530 THERAPEUTIC ACTIVITIES: CPT | Performed by: PHYSICAL THERAPIST

## 2023-04-18 PROCEDURE — 97110 THERAPEUTIC EXERCISES: CPT | Performed by: PHYSICAL THERAPIST

## 2023-04-18 PROCEDURE — 97112 NEUROMUSCULAR REEDUCATION: CPT | Performed by: PHYSICAL THERAPIST

## 2023-04-18 NOTE — PROGRESS NOTES
Physical Therapy Daily Treatment Note    [address] [facility], [address]  [place] [address]    Patient: Jaqui Matute   : 1931  Referring practitioner: MAGI Grady  Date of Initial Visit: Type: THERAPY  Noted: 3/13/2023  Today's Date: 2023  Patient seen for 8 sessions       Visit Diagnoses:    ICD-10-CM ICD-9-CM   1. Chronic pain of left knee  M25.562 719.46    G89.29 338.29   2. Impaired functional mobility, balance, gait, and endurance  Z74.09 V49.89       Subjective   Patient reports nothing new, states 'same ole same with knee'. States  her pain level is 1/10 upon arrival.  States she has been doing her exercises. Pt's son reports he can tell she is improving with therapy.     Objective   TU seconds with RW, impaired safety awareness with turning  O2 95% on RA, HR 82 after walk.    See Exercise, Manual, and Modality Logs for complete treatment.       Assessment/Plan   Patient ambulated 162 ft, using RW, step through gait pattern, prior to seated rest break required from L knee pain and fatigue. She tolerated increased standing time with exercises and verbalized improvement with functional transfers at home. TUG was 36 seconds, most at risk for falls with turning when walking or going to sit down. Continue progression of generalized strengthening for improved functional mobility, decreased pain.       Timed:         Manual Therapy:    0     mins  35754;     Therapeutic Exercise:    23     mins  10093;     Neuromuscular Libia:    12    mins  70714;    Therapeutic Activity:     10     mins  61465;     Gait Trainin     mins  53209;     Ultrasound:     0     mins  33378;    Ionto                               0    mins   33820  Self Care                       0     mins   45867  Canalith Repos    0     mins 66279      Un-Timed:  Electrical Stimulation:    0     mins  35142 ( );  Dry Needling     0     mins self-pay  Traction     0     mins 85440      Timed Treatment:   45    Subjective   Patient ID: Matteo is a 7 mins   Total Treatment:     45   mins    Corinne E. Perkins, PT  [place] License: [id number]

## 2023-04-20 ENCOUNTER — TREATMENT (OUTPATIENT)
Dept: PHYSICAL THERAPY | Facility: CLINIC | Age: 88
End: 2023-04-20
Payer: MEDICARE

## 2023-04-20 DIAGNOSIS — Z74.09 IMPAIRED FUNCTIONAL MOBILITY, BALANCE, GAIT, AND ENDURANCE: ICD-10-CM

## 2023-04-20 DIAGNOSIS — G89.29 CHRONIC PAIN OF LEFT KNEE: Primary | ICD-10-CM

## 2023-04-20 DIAGNOSIS — M25.562 CHRONIC PAIN OF LEFT KNEE: Primary | ICD-10-CM

## 2023-04-20 PROCEDURE — 97110 THERAPEUTIC EXERCISES: CPT | Performed by: PHYSICAL THERAPIST

## 2023-04-20 PROCEDURE — 97112 NEUROMUSCULAR REEDUCATION: CPT | Performed by: PHYSICAL THERAPIST

## 2023-04-20 PROCEDURE — 97530 THERAPEUTIC ACTIVITIES: CPT | Performed by: PHYSICAL THERAPIST

## 2023-04-20 NOTE — PROGRESS NOTES
Physical Therapy Daily Treatment Note    [address] [facility], [address]  [place] [address]    Patient: Jaqui Matute   : 1931  Referring practitioner: MAGI Grady  Date of Initial Visit: Type: THERAPY  Noted: 3/13/2023  Today's Date: 2023  Patient seen for 9 sessions       Visit Diagnoses:    ICD-10-CM ICD-9-CM   1. Chronic pain of left knee  M25.562 719.46    G89.29 338.29   2. Impaired functional mobility, balance, gait, and endurance  Z74.09 V49.89       Subjective   Patient reports she is feeling well today. States  her pain level is mild, 2-3/10 upon arrival.      Objective     225 ft walk prior to required seated rest break  22 sec sit-stands 5x    See Exercise, Manual, and Modality Logs for complete treatment.       Assessment/Plan  Pt demonstrates improved gait speed and endurance prior to BLE fatigue. Ambulated 225 ft in clinic with AD, step through gait pattern and improved stride length prior to fatigue. Pt able to perform sit to stand test 5x in 22 seconds, using BUE to push from mat to stand. Continue progression of standing static and dynamic balance exercises as tolerated.     Timed:         Manual Therapy:    0     mins  21529;     Therapeutic Exercise:    15     mins  67224;     Neuromuscular Libia:    14    mins  76644;    Therapeutic Activity:     10     mins  94719;     Gait Trainin     mins  28257;     Ultrasound:     0     mins  11612;    Ionto                               0    mins   03992  Self Care                       0     mins   31342  Canalith Repos    0     mins 22734      Un-Timed:  Electrical Stimulation:    0     mins  00392 ( );  Dry Needling     0     mins self-pay  Traction     0     mins 25569      Timed Treatment:   39   mins   Total Treatment:     39   mins    Corinne E. Perkins, PT  [place] License: [id number]

## 2023-04-25 ENCOUNTER — TREATMENT (OUTPATIENT)
Dept: PHYSICAL THERAPY | Facility: CLINIC | Age: 88
End: 2023-04-25
Payer: MEDICARE

## 2023-04-25 DIAGNOSIS — M25.562 CHRONIC PAIN OF LEFT KNEE: Primary | ICD-10-CM

## 2023-04-25 DIAGNOSIS — G89.29 CHRONIC PAIN OF LEFT KNEE: Primary | ICD-10-CM

## 2023-04-25 DIAGNOSIS — Z74.09 IMPAIRED FUNCTIONAL MOBILITY, BALANCE, GAIT, AND ENDURANCE: ICD-10-CM

## 2023-04-25 PROCEDURE — 97530 THERAPEUTIC ACTIVITIES: CPT | Performed by: PHYSICAL THERAPIST

## 2023-04-26 NOTE — PROGRESS NOTES
Physical Therapy Daily Treatment Note    [address] [facility], [address]  [place] [address]    Patient: Jaqui Matute   : 1931  Referring practitioner: MAGI Grady  Date of Initial Visit: Type: THERAPY  Noted: 3/13/2023  Today's Date: 2023  Patient seen for 10 sessions       Visit Diagnoses:    ICD-10-CM ICD-9-CM   1. Chronic pain of left knee  M25.562 719.46    G89.29 338.29   2. Impaired functional mobility, balance, gait, and endurance  Z74.09 V49.89       Subjective   Patient's son reports patient had a fall about 1-2 hours ago, slide out of her chair and landed on right ribcage. States significant pain has increased, mild pain with deep breathing. States she has taken Tylenol extra strength as preventative prior to therapy, hasn't had any imagin. States   her pain level is 6-7/10 upon arrival.      Objective   TTP right flank, lower T7-9 ribs    See Exercise, Manual, and Modality Logs for complete treatment.       Assessment/Plan  Held further progression of therapy in clinic today secondary to increased pain and limited tolerance to movement after fall. Pt noted increased pain with small movements of right arm, attempted RLE or LLE movement or functional transfers. Encouraged patient to f/u for imaging if pain gets worse due to acute fall, to r/o rib fracture or internal damage.     Timed:         Manual Therapy:    0     mins  43516;     Therapeutic Exercise:    0     mins  18072;     Neuromuscular Libia:    0    mins  05737;    Therapeutic Activity:     10     mins  00804;     Gait Trainin     mins  74962;     Ultrasound:     0     mins  01649;    Ionto                               0    mins   54849  Self Care                       0     mins   12048  Canalith Repos    0     mins 39912      Un-Timed:  Electrical Stimulation:    0     mins  47094 (MC );  Dry Needling     0     mins self-pay  Traction     0     mins 06362      Timed Treatment:   10   mins   Total Treatment:      10   mins    Corinne E. Perkins, PT  [place] License: [id number]

## 2023-11-11 DIAGNOSIS — N39.41 URGE INCONTINENCE OF URINE: ICD-10-CM

## 2023-11-13 RX ORDER — OXYBUTYNIN CHLORIDE 10 MG/1
10 TABLET, EXTENDED RELEASE ORAL DAILY
Qty: 30 TABLET | Refills: 0 | Status: SHIPPED | OUTPATIENT
Start: 2023-11-13

## 2023-11-13 RX ORDER — LEVOTHYROXINE SODIUM 112 UG/1
112 TABLET ORAL DAILY
Qty: 90 TABLET | Refills: 0 | Status: SHIPPED | OUTPATIENT
Start: 2023-11-13

## 2023-12-09 DIAGNOSIS — N39.41 URGE INCONTINENCE OF URINE: ICD-10-CM

## 2023-12-11 RX ORDER — OXYBUTYNIN CHLORIDE 10 MG/1
10 TABLET, EXTENDED RELEASE ORAL DAILY
Qty: 30 TABLET | Refills: 0 | Status: SHIPPED | OUTPATIENT
Start: 2023-12-11

## 2024-01-01 ENCOUNTER — APPOINTMENT (OUTPATIENT)
Dept: CT IMAGING | Facility: HOSPITAL | Age: 89
End: 2024-01-01
Payer: MEDICARE

## 2024-01-01 ENCOUNTER — HOSPITAL ENCOUNTER (INPATIENT)
Facility: HOSPITAL | Age: 89
LOS: 2 days | End: 2024-09-17
Attending: EMERGENCY MEDICINE | Admitting: INTERNAL MEDICINE
Payer: MEDICARE

## 2024-01-01 ENCOUNTER — APPOINTMENT (OUTPATIENT)
Dept: ULTRASOUND IMAGING | Facility: HOSPITAL | Age: 89
End: 2024-01-01
Payer: MEDICARE

## 2024-01-01 ENCOUNTER — APPOINTMENT (OUTPATIENT)
Dept: GENERAL RADIOLOGY | Facility: HOSPITAL | Age: 89
End: 2024-01-01
Payer: MEDICARE

## 2024-01-01 ENCOUNTER — APPOINTMENT (OUTPATIENT)
Dept: CARDIOLOGY | Facility: HOSPITAL | Age: 89
End: 2024-01-01
Payer: MEDICARE

## 2024-01-01 VITALS
WEIGHT: 150 LBS | SYSTOLIC BLOOD PRESSURE: 213 MMHG | BODY MASS INDEX: 24.99 KG/M2 | HEART RATE: 89 BPM | DIASTOLIC BLOOD PRESSURE: 141 MMHG | TEMPERATURE: 97.8 F | OXYGEN SATURATION: 73 % | HEIGHT: 65 IN | RESPIRATION RATE: 25 BRPM

## 2024-01-01 DIAGNOSIS — F03.90 DEMENTIA WITHOUT BEHAVIORAL DISTURBANCE, PSYCHOTIC DISTURBANCE, MOOD DISTURBANCE, OR ANXIETY, UNSPECIFIED DEMENTIA SEVERITY, UNSPECIFIED DEMENTIA TYPE: ICD-10-CM

## 2024-01-01 DIAGNOSIS — R07.9 ACUTE CHEST PAIN: ICD-10-CM

## 2024-01-01 DIAGNOSIS — I50.9 ACUTE CONGESTIVE HEART FAILURE, UNSPECIFIED HEART FAILURE TYPE: Primary | ICD-10-CM

## 2024-01-01 LAB
ALBUMIN SERPL-MCNC: 3.5 G/DL (ref 3.5–5.2)
ALBUMIN SERPL-MCNC: 3.6 G/DL (ref 3.5–5.2)
ALBUMIN SERPL-MCNC: 3.7 G/DL (ref 3.5–5.2)
ALBUMIN/GLOB SERPL: 1.3 G/DL
ALBUMIN/GLOB SERPL: 1.4 G/DL
ALP SERPL-CCNC: 190 U/L (ref 39–117)
ALP SERPL-CCNC: 191 U/L (ref 39–117)
ALP SERPL-CCNC: 193 U/L (ref 39–117)
ALT SERPL W P-5'-P-CCNC: 42 U/L (ref 1–33)
ALT SERPL W P-5'-P-CCNC: 53 U/L (ref 1–33)
ALT SERPL W P-5'-P-CCNC: 62 U/L (ref 1–33)
ANION GAP SERPL CALCULATED.3IONS-SCNC: 10 MMOL/L (ref 5–15)
ANION GAP SERPL CALCULATED.3IONS-SCNC: 11 MMOL/L (ref 5–15)
ANION GAP SERPL CALCULATED.3IONS-SCNC: 14 MMOL/L (ref 5–15)
ARTERIAL PATENCY WRIST A: ABNORMAL
ASCENDING AORTA: 4.6 CM
AST SERPL-CCNC: 20 U/L (ref 1–32)
AST SERPL-CCNC: 27 U/L (ref 1–32)
AST SERPL-CCNC: 32 U/L (ref 1–32)
ATMOSPHERIC PRESS: ABNORMAL MM[HG]
BASE EXCESS BLDA CALC-SCNC: 3 MMOL/L (ref 0–2)
BASOPHILS # BLD AUTO: 0.1 10*3/MM3 (ref 0–0.2)
BASOPHILS # BLD AUTO: 0.12 10*3/MM3 (ref 0–0.2)
BASOPHILS NFR BLD AUTO: 1 % (ref 0–1.5)
BASOPHILS NFR BLD AUTO: 1.1 % (ref 0–1.5)
BDY SITE: ABNORMAL
BH CV ECHO MEAS - AO MAX PG: 7 MMHG
BH CV ECHO MEAS - AO MEAN PG: 4 MMHG
BH CV ECHO MEAS - AO ROOT DIAM: 2.7 CM
BH CV ECHO MEAS - AO V2 MAX: 132.4 CM/SEC
BH CV ECHO MEAS - AO V2 VTI: 21.9 CM
BH CV ECHO MEAS - AVA(I,D): 1.34 CM2
BH CV ECHO MEAS - EDV(CUBED): 22 ML
BH CV ECHO MEAS - EDV(MOD-SP2): 73 ML
BH CV ECHO MEAS - EDV(MOD-SP4): 75.3 ML
BH CV ECHO MEAS - EF(MOD-BP): 42.8 %
BH CV ECHO MEAS - EF(MOD-SP2): 43.6 %
BH CV ECHO MEAS - EF(MOD-SP4): 42.1 %
BH CV ECHO MEAS - ESV(CUBED): 12.2 ML
BH CV ECHO MEAS - ESV(MOD-SP2): 41.2 ML
BH CV ECHO MEAS - ESV(MOD-SP4): 43.6 ML
BH CV ECHO MEAS - FS: 17.9 %
BH CV ECHO MEAS - IVS/LVPW: 1 CM
BH CV ECHO MEAS - IVSD: 1.1 CM
BH CV ECHO MEAS - LA DIMENSION: 4.7 CM
BH CV ECHO MEAS - LAT PEAK E' VEL: 11.3 CM/SEC
BH CV ECHO MEAS - LV MASS(C)D: 86.3 GRAMS
BH CV ECHO MEAS - LV MAX PG: 1.8 MMHG
BH CV ECHO MEAS - LV MEAN PG: 1 MMHG
BH CV ECHO MEAS - LV V1 MAX: 66.7 CM/SEC
BH CV ECHO MEAS - LV V1 VTI: 9.4 CM
BH CV ECHO MEAS - LVIDD: 2.8 CM
BH CV ECHO MEAS - LVIDS: 2.3 CM
BH CV ECHO MEAS - LVOT AREA: 3.1 CM2
BH CV ECHO MEAS - LVOT DIAM: 2 CM
BH CV ECHO MEAS - LVPWD: 1.1 CM
BH CV ECHO MEAS - MED PEAK E' VEL: 6.2 CM/SEC
BH CV ECHO MEAS - MV DEC SLOPE: 1103 CM/SEC2
BH CV ECHO MEAS - MV DEC TIME: 0.12 SEC
BH CV ECHO MEAS - MV E MAX VEL: 132 CM/SEC
BH CV ECHO MEAS - MV MAX PG: 9.8 MMHG
BH CV ECHO MEAS - MV MEAN PG: 6 MMHG
BH CV ECHO MEAS - MV P1/2T: 42.2 MSEC
BH CV ECHO MEAS - MV V2 VTI: 16.2 CM
BH CV ECHO MEAS - MVA(P1/2T): 5.2 CM2
BH CV ECHO MEAS - MVA(VTI): 1.82 CM2
BH CV ECHO MEAS - PA ACC TIME: 0.09 SEC
BH CV ECHO MEAS - PA V2 MAX: 58.4 CM/SEC
BH CV ECHO MEAS - RAP SYSTOLE: 15 MMHG
BH CV ECHO MEAS - RVSP: 65 MMHG
BH CV ECHO MEAS - SV(LVOT): 29.4 ML
BH CV ECHO MEAS - SV(MOD-SP2): 31.8 ML
BH CV ECHO MEAS - SV(MOD-SP4): 31.7 ML
BH CV ECHO MEAS - TAPSE (>1.6): 1.7 CM
BH CV ECHO MEAS - TR MAX PG: 50 MMHG
BH CV ECHO MEAS - TR MAX VEL: 353 CM/SEC
BH CV ECHO MEASUREMENTS AVERAGE E/E' RATIO: 15.09
BH CV LOWER VASCULAR LEFT COMMON FEMORAL AUGMENT: NORMAL
BH CV LOWER VASCULAR LEFT COMMON FEMORAL COMPRESS: NORMAL
BH CV LOWER VASCULAR LEFT COMMON FEMORAL PHASIC: NORMAL
BH CV LOWER VASCULAR LEFT COMMON FEMORAL SPONT: NORMAL
BH CV LOWER VASCULAR LEFT DISTAL FEMORAL AUGMENT: NORMAL
BH CV LOWER VASCULAR LEFT DISTAL FEMORAL COMPRESS: NORMAL
BH CV LOWER VASCULAR LEFT DISTAL FEMORAL PHASIC: NORMAL
BH CV LOWER VASCULAR LEFT DISTAL FEMORAL SPONT: NORMAL
BH CV LOWER VASCULAR LEFT GASTRONEMIUS COMPRESS: NORMAL
BH CV LOWER VASCULAR LEFT GREATER SAPH AK COMPRESS: NORMAL
BH CV LOWER VASCULAR LEFT GREATER SAPH BK COMPRESS: NORMAL
BH CV LOWER VASCULAR LEFT LESSER SAPH COMPRESS: NORMAL
BH CV LOWER VASCULAR LEFT MID FEMORAL AUGMENT: NORMAL
BH CV LOWER VASCULAR LEFT MID FEMORAL COMPRESS: NORMAL
BH CV LOWER VASCULAR LEFT MID FEMORAL PHASIC: NORMAL
BH CV LOWER VASCULAR LEFT MID FEMORAL SPONT: NORMAL
BH CV LOWER VASCULAR LEFT PERONEAL COMPRESS: NORMAL
BH CV LOWER VASCULAR LEFT POPLITEAL AUGMENT: NORMAL
BH CV LOWER VASCULAR LEFT POPLITEAL COMPRESS: NORMAL
BH CV LOWER VASCULAR LEFT POPLITEAL PHASIC: NORMAL
BH CV LOWER VASCULAR LEFT POPLITEAL SPONT: NORMAL
BH CV LOWER VASCULAR LEFT POSTERIOR TIBIAL COMPRESS: NORMAL
BH CV LOWER VASCULAR LEFT PROFUNDA FEMORAL PHASIC: NORMAL
BH CV LOWER VASCULAR LEFT PROFUNDA FEMORAL SPONT: NORMAL
BH CV LOWER VASCULAR LEFT PROXIMAL FEMORAL AUGMENT: NORMAL
BH CV LOWER VASCULAR LEFT PROXIMAL FEMORAL COMPRESS: NORMAL
BH CV LOWER VASCULAR LEFT PROXIMAL FEMORAL PHASIC: NORMAL
BH CV LOWER VASCULAR LEFT PROXIMAL FEMORAL SPONT: NORMAL
BH CV LOWER VASCULAR LEFT SAPHENOFEMORAL JUNCTION AUGMENT: NORMAL
BH CV LOWER VASCULAR LEFT SAPHENOFEMORAL JUNCTION COMPRESS: NORMAL
BH CV LOWER VASCULAR LEFT SAPHENOFEMORAL JUNCTION PHASIC: NORMAL
BH CV LOWER VASCULAR LEFT SAPHENOFEMORAL JUNCTION SPONT: NORMAL
BH CV LOWER VASCULAR RIGHT COMMON FEMORAL AUGMENT: NORMAL
BH CV LOWER VASCULAR RIGHT COMMON FEMORAL COMPRESS: NORMAL
BH CV LOWER VASCULAR RIGHT COMMON FEMORAL PHASIC: NORMAL
BH CV LOWER VASCULAR RIGHT COMMON FEMORAL SPONT: NORMAL
BH CV LOWER VASCULAR RIGHT DISTAL FEMORAL AUGMENT: NORMAL
BH CV LOWER VASCULAR RIGHT DISTAL FEMORAL COMPRESS: NORMAL
BH CV LOWER VASCULAR RIGHT DISTAL FEMORAL PHASIC: NORMAL
BH CV LOWER VASCULAR RIGHT DISTAL FEMORAL SPONT: NORMAL
BH CV LOWER VASCULAR RIGHT GASTRONEMIUS COMPRESS: NORMAL
BH CV LOWER VASCULAR RIGHT GREATER SAPH AK COMPRESS: NORMAL
BH CV LOWER VASCULAR RIGHT GREATER SAPH BK COMPRESS: NORMAL
BH CV LOWER VASCULAR RIGHT LESSER SAPH COMPRESS: NORMAL
BH CV LOWER VASCULAR RIGHT MID FEMORAL AUGMENT: NORMAL
BH CV LOWER VASCULAR RIGHT MID FEMORAL COMPRESS: NORMAL
BH CV LOWER VASCULAR RIGHT MID FEMORAL PHASIC: NORMAL
BH CV LOWER VASCULAR RIGHT MID FEMORAL SPONT: NORMAL
BH CV LOWER VASCULAR RIGHT PERONEAL COMPRESS: NORMAL
BH CV LOWER VASCULAR RIGHT POPLITEAL AUGMENT: NORMAL
BH CV LOWER VASCULAR RIGHT POPLITEAL COMPRESS: NORMAL
BH CV LOWER VASCULAR RIGHT POPLITEAL PHASIC: NORMAL
BH CV LOWER VASCULAR RIGHT POPLITEAL SPONT: NORMAL
BH CV LOWER VASCULAR RIGHT POSTERIOR TIBIAL COMPRESS: NORMAL
BH CV LOWER VASCULAR RIGHT PROFUNDA FEMORAL PHASIC: NORMAL
BH CV LOWER VASCULAR RIGHT PROFUNDA FEMORAL SPONT: NORMAL
BH CV LOWER VASCULAR RIGHT PROXIMAL FEMORAL AUGMENT: NORMAL
BH CV LOWER VASCULAR RIGHT PROXIMAL FEMORAL COMPRESS: NORMAL
BH CV LOWER VASCULAR RIGHT PROXIMAL FEMORAL PHASIC: NORMAL
BH CV LOWER VASCULAR RIGHT PROXIMAL FEMORAL SPONT: NORMAL
BH CV LOWER VASCULAR RIGHT SAPHENOFEMORAL JUNCTION AUGMENT: NORMAL
BH CV LOWER VASCULAR RIGHT SAPHENOFEMORAL JUNCTION COMPRESS: NORMAL
BH CV LOWER VASCULAR RIGHT SAPHENOFEMORAL JUNCTION PHASIC: NORMAL
BH CV LOWER VASCULAR RIGHT SAPHENOFEMORAL JUNCTION SPONT: NORMAL
BH CV VAS BP LEFT ARM: NORMAL MMHG
BH CV XLRA - RV BASE: 3.5 CM
BH CV XLRA - RV LENGTH: 5.8 CM
BH CV XLRA - RV MID: 2.5 CM
BH CV XLRA - TDI S': 11.9 CM/SEC
BILIRUB CONJ SERPL-MCNC: 0.2 MG/DL (ref 0–0.3)
BILIRUB INDIRECT SERPL-MCNC: 0.1 MG/DL
BILIRUB SERPL-MCNC: 0.3 MG/DL (ref 0–1.2)
BODY TEMPERATURE: 37
BUN SERPL-MCNC: 12 MG/DL (ref 8–23)
BUN SERPL-MCNC: 13 MG/DL (ref 8–23)
BUN SERPL-MCNC: 14 MG/DL (ref 8–23)
BUN/CREAT SERPL: 13.4 (ref 7–25)
BUN/CREAT SERPL: 13.6 (ref 7–25)
BUN/CREAT SERPL: 15.1 (ref 7–25)
CALCIUM SPEC-SCNC: 8.3 MG/DL (ref 8.2–9.6)
CALCIUM SPEC-SCNC: 8.4 MG/DL (ref 8.2–9.6)
CALCIUM SPEC-SCNC: 8.5 MG/DL (ref 8.2–9.6)
CHLORIDE SERPL-SCNC: 103 MMOL/L (ref 98–107)
CHLORIDE SERPL-SCNC: 105 MMOL/L (ref 98–107)
CHLORIDE SERPL-SCNC: 108 MMOL/L (ref 98–107)
CO2 BLDA-SCNC: 39.2 MMOL/L (ref 22–33)
CO2 SERPL-SCNC: 33 MMOL/L (ref 22–29)
CO2 SERPL-SCNC: 33 MMOL/L (ref 22–29)
CO2 SERPL-SCNC: 34 MMOL/L (ref 22–29)
COHGB MFR BLD: 1.5 % (ref 0–2)
CREAT SERPL-MCNC: 0.88 MG/DL (ref 0.57–1)
CREAT SERPL-MCNC: 0.93 MG/DL (ref 0.57–1)
CREAT SERPL-MCNC: 0.97 MG/DL (ref 0.57–1)
D-LACTATE SERPL-SCNC: 2.2 MMOL/L (ref 0.5–2)
DEPRECATED RDW RBC AUTO: 69.3 FL (ref 37–54)
DEPRECATED RDW RBC AUTO: 71 FL (ref 37–54)
DEPRECATED RDW RBC AUTO: 71 FL (ref 37–54)
EGFRCR SERPLBLD CKD-EPI 2021: 54.6 ML/MIN/1.73
EGFRCR SERPLBLD CKD-EPI 2021: 57.4 ML/MIN/1.73
EGFRCR SERPLBLD CKD-EPI 2021: 61.4 ML/MIN/1.73
EOSINOPHIL # BLD AUTO: 0.63 10*3/MM3 (ref 0–0.4)
EOSINOPHIL # BLD AUTO: 0.75 10*3/MM3 (ref 0–0.4)
EOSINOPHIL NFR BLD AUTO: 6.1 % (ref 0.3–6.2)
EOSINOPHIL NFR BLD AUTO: 6.6 % (ref 0.3–6.2)
EPAP: 8
ERYTHROCYTE [DISTWIDTH] IN BLOOD BY AUTOMATED COUNT: 17.5 % (ref 12.3–15.4)
ERYTHROCYTE [DISTWIDTH] IN BLOOD BY AUTOMATED COUNT: 18.2 % (ref 12.3–15.4)
ERYTHROCYTE [DISTWIDTH] IN BLOOD BY AUTOMATED COUNT: 18.3 % (ref 12.3–15.4)
FERRITIN SERPL-MCNC: 96.49 NG/ML (ref 13–150)
FOLATE SERPL-MCNC: 12.5 NG/ML (ref 4.78–24.2)
GEN 5 2HR TROPONIN T REFLEX: 54 NG/L
GLOBULIN UR ELPH-MCNC: 2.6 GM/DL
GLOBULIN UR ELPH-MCNC: 2.6 GM/DL
GLUCOSE SERPL-MCNC: 112 MG/DL (ref 65–99)
GLUCOSE SERPL-MCNC: 94 MG/DL (ref 65–99)
GLUCOSE SERPL-MCNC: 97 MG/DL (ref 65–99)
HCO3 BLDA-SCNC: 35.7 MMOL/L (ref 20–26)
HCT VFR BLD AUTO: 36.4 % (ref 34–46.6)
HCT VFR BLD AUTO: 37.6 % (ref 34–46.6)
HCT VFR BLD AUTO: 37.9 % (ref 34–46.6)
HCT VFR BLD CALC: 35.8 % (ref 38–51)
HGB BLD-MCNC: 11 G/DL (ref 12–15.9)
HGB BLD-MCNC: 11.2 G/DL (ref 12–15.9)
HGB BLD-MCNC: 11.4 G/DL (ref 12–15.9)
HGB BLDA-MCNC: 11.7 G/DL (ref 14–18)
HOLD SPECIMEN: NORMAL
IMM GRANULOCYTES # BLD AUTO: 0.08 10*3/MM3 (ref 0–0.05)
IMM GRANULOCYTES # BLD AUTO: 0.1 10*3/MM3 (ref 0–0.05)
IMM GRANULOCYTES NFR BLD AUTO: 0.8 % (ref 0–0.5)
IMM GRANULOCYTES NFR BLD AUTO: 0.9 % (ref 0–0.5)
INHALED O2 CONCENTRATION: 100 %
INR PPP: 1.27 (ref 0.89–1.12)
IPAP: 16
IRON 24H UR-MRATE: 21 MCG/DL (ref 37–145)
IRON SATN MFR SERPL: 6 % (ref 20–50)
LEFT ATRIUM VOLUME INDEX: 56 ML/M2
LIPASE SERPL-CCNC: 11 U/L (ref 13–60)
LYMPHOCYTES # BLD AUTO: 0.64 10*3/MM3 (ref 0.7–3.1)
LYMPHOCYTES # BLD AUTO: 0.75 10*3/MM3 (ref 0.7–3.1)
LYMPHOCYTES NFR BLD AUTO: 6.1 % (ref 19.6–45.3)
LYMPHOCYTES NFR BLD AUTO: 6.6 % (ref 19.6–45.3)
Lab: ABNORMAL
MAGNESIUM SERPL-MCNC: 1.6 MG/DL (ref 1.7–2.3)
MCH RBC QN AUTO: 31.3 PG (ref 26.6–33)
MCH RBC QN AUTO: 31.3 PG (ref 26.6–33)
MCH RBC QN AUTO: 31.6 PG (ref 26.6–33)
MCHC RBC AUTO-ENTMCNC: 29.8 G/DL (ref 31.5–35.7)
MCHC RBC AUTO-ENTMCNC: 30.1 G/DL (ref 31.5–35.7)
MCHC RBC AUTO-ENTMCNC: 30.2 G/DL (ref 31.5–35.7)
MCV RBC AUTO: 104.1 FL (ref 79–97)
MCV RBC AUTO: 104.6 FL (ref 79–97)
MCV RBC AUTO: 105 FL (ref 79–97)
METHGB BLD QL: 0.2 % (ref 0–1.5)
MODALITY: ABNORMAL
MONOCYTES # BLD AUTO: 0.96 10*3/MM3 (ref 0.1–0.9)
MONOCYTES # BLD AUTO: 1.31 10*3/MM3 (ref 0.1–0.9)
MONOCYTES NFR BLD AUTO: 11.5 % (ref 5–12)
MONOCYTES NFR BLD AUTO: 9.2 % (ref 5–12)
NEUTROPHILS NFR BLD AUTO: 73.3 % (ref 42.7–76)
NEUTROPHILS NFR BLD AUTO: 76.8 % (ref 42.7–76)
NEUTROPHILS NFR BLD AUTO: 8 10*3/MM3 (ref 1.7–7)
NEUTROPHILS NFR BLD AUTO: 8.39 10*3/MM3 (ref 1.7–7)
NOTIFIED BY: ABNORMAL
NOTIFIED WHO: ABNORMAL
NRBC BLD AUTO-RTO: 0.2 /100 WBC (ref 0–0.2)
NRBC BLD AUTO-RTO: 0.2 /100 WBC (ref 0–0.2)
NT-PROBNP SERPL-MCNC: 5163 PG/ML (ref 0–1800)
OXYHGB MFR BLDV: 70.5 % (ref 94–99)
PAW @ PEAK INSP FLOW SETTING VENT: 0 CMH2O
PCO2 BLDA: ABNORMAL MM[HG]
PCO2 TEMP ADJ BLD: ABNORMAL MM[HG]
PH BLDA: 7.1 PH UNITS (ref 7.35–7.45)
PH, TEMP CORRECTED: 7.1 PH UNITS
PLATELET # BLD AUTO: 203 10*3/MM3 (ref 140–450)
PLATELET # BLD AUTO: 218 10*3/MM3 (ref 140–450)
PLATELET # BLD AUTO: 221 10*3/MM3 (ref 140–450)
PMV BLD AUTO: 10.5 FL (ref 6–12)
PMV BLD AUTO: 10.9 FL (ref 6–12)
PMV BLD AUTO: 11.1 FL (ref 6–12)
PO2 BLDA: 50.3 MM HG (ref 83–108)
PO2 TEMP ADJ BLD: 50.3 MM HG (ref 83–108)
POTASSIUM SERPL-SCNC: 4.2 MMOL/L (ref 3.5–5.2)
POTASSIUM SERPL-SCNC: 4.5 MMOL/L (ref 3.5–5.2)
POTASSIUM SERPL-SCNC: 4.7 MMOL/L (ref 3.5–5.2)
PROT SERPL-MCNC: 6.1 G/DL (ref 6–8.5)
PROT SERPL-MCNC: 6.2 G/DL (ref 6–8.5)
PROT SERPL-MCNC: 6.3 G/DL (ref 6–8.5)
PROTHROMBIN TIME: 16 SECONDS (ref 12.2–14.5)
QT INTERVAL: 364 MS
QT INTERVAL: 366 MS
QTC INTERVAL: 477 MS
QTC INTERVAL: 483 MS
RBC # BLD AUTO: 3.48 10*6/MM3 (ref 3.77–5.28)
RBC # BLD AUTO: 3.58 10*6/MM3 (ref 3.77–5.28)
RBC # BLD AUTO: 3.64 10*6/MM3 (ref 3.77–5.28)
SODIUM SERPL-SCNC: 148 MMOL/L (ref 136–145)
SODIUM SERPL-SCNC: 151 MMOL/L (ref 136–145)
SODIUM SERPL-SCNC: 152 MMOL/L (ref 136–145)
TIBC SERPL-MCNC: 332 MCG/DL (ref 298–536)
TOTAL RATE: 0 BREATHS/MINUTE
TRANSFERRIN SERPL-MCNC: 223 MG/DL (ref 200–360)
TROPONIN T DELTA: -8 NG/L
TROPONIN T SERPL HS-MCNC: 62 NG/L
TSH SERPL DL<=0.05 MIU/L-ACNC: 3.29 UIU/ML (ref 0.27–4.2)
VENTILATOR MODE: ABNORMAL
VIT B12 BLD-MCNC: 1539 PG/ML (ref 211–946)
VT ON VENT VENT: 0.27 ML
WBC NRBC COR # BLD AUTO: 10.41 10*3/MM3 (ref 3.4–10.8)
WBC NRBC COR # BLD AUTO: 11.42 10*3/MM3 (ref 3.4–10.8)
WBC NRBC COR # BLD AUTO: 11.8 10*3/MM3 (ref 3.4–10.8)
WHOLE BLOOD HOLD COAG: NORMAL
WHOLE BLOOD HOLD SPECIMEN: NORMAL

## 2024-01-01 PROCEDURE — 25010000002 EPINEPHRINE 1 MG/10ML SOLUTION PREFILLED SYRINGE: Performed by: EMERGENCY MEDICINE

## 2024-01-01 PROCEDURE — 25010000002 DIGOXIN PER 500 MCG: Performed by: INTERNAL MEDICINE

## 2024-01-01 PROCEDURE — 83690 ASSAY OF LIPASE: CPT | Performed by: EMERGENCY MEDICINE

## 2024-01-01 PROCEDURE — 80048 BASIC METABOLIC PNL TOTAL CA: CPT | Performed by: INTERNAL MEDICINE

## 2024-01-01 PROCEDURE — 25510000001 IOPAMIDOL PER 1 ML: Performed by: EMERGENCY MEDICINE

## 2024-01-01 PROCEDURE — 99223 1ST HOSP IP/OBS HIGH 75: CPT | Performed by: PHYSICIAN ASSISTANT

## 2024-01-01 PROCEDURE — 71275 CT ANGIOGRAPHY CHEST: CPT

## 2024-01-01 PROCEDURE — 25010000002 MIDAZOLAM PER 1 MG: Performed by: PHYSICAL MEDICINE & REHABILITATION

## 2024-01-01 PROCEDURE — 83735 ASSAY OF MAGNESIUM: CPT | Performed by: PHYSICIAN ASSISTANT

## 2024-01-01 PROCEDURE — 5A12012 PERFORMANCE OF CARDIAC OUTPUT, SINGLE, MANUAL: ICD-10-PCS | Performed by: INTERNAL MEDICINE

## 2024-01-01 PROCEDURE — 94799 UNLISTED PULMONARY SVC/PX: CPT

## 2024-01-01 PROCEDURE — 25510000001 IOPAMIDOL PER 1 ML: Performed by: INTERNAL MEDICINE

## 2024-01-01 PROCEDURE — 75635 CT ANGIO ABDOMINAL ARTERIES: CPT

## 2024-01-01 PROCEDURE — 25010000002 HEPARIN (PORCINE) PER 1000 UNITS: Performed by: PHYSICIAN ASSISTANT

## 2024-01-01 PROCEDURE — 93970 EXTREMITY STUDY: CPT

## 2024-01-01 PROCEDURE — 25010000002 SULFUR HEXAFLUORIDE MICROSPH 60.7-25 MG RECONSTITUTED SUSPENSION: Performed by: PHYSICIAN ASSISTANT

## 2024-01-01 PROCEDURE — 93306 TTE W/DOPPLER COMPLETE: CPT | Performed by: INTERNAL MEDICINE

## 2024-01-01 PROCEDURE — 25010000002 FUROSEMIDE PER 20 MG: Performed by: INTERNAL MEDICINE

## 2024-01-01 PROCEDURE — 99285 EMERGENCY DEPT VISIT HI MDM: CPT

## 2024-01-01 PROCEDURE — 36415 COLL VENOUS BLD VENIPUNCTURE: CPT

## 2024-01-01 PROCEDURE — 82805 BLOOD GASES W/O2 SATURATION: CPT

## 2024-01-01 PROCEDURE — 97161 PT EVAL LOW COMPLEX 20 MIN: CPT

## 2024-01-01 PROCEDURE — 82607 VITAMIN B-12: CPT | Performed by: PHYSICIAN ASSISTANT

## 2024-01-01 PROCEDURE — 87040 BLOOD CULTURE FOR BACTERIA: CPT | Performed by: FAMILY MEDICINE

## 2024-01-01 PROCEDURE — 97166 OT EVAL MOD COMPLEX 45 MIN: CPT

## 2024-01-01 PROCEDURE — 80076 HEPATIC FUNCTION PANEL: CPT | Performed by: INTERNAL MEDICINE

## 2024-01-01 PROCEDURE — 85025 COMPLETE CBC W/AUTO DIFF WBC: CPT | Performed by: PHYSICIAN ASSISTANT

## 2024-01-01 PROCEDURE — 71045 X-RAY EXAM CHEST 1 VIEW: CPT

## 2024-01-01 PROCEDURE — 82746 ASSAY OF FOLIC ACID SERUM: CPT | Performed by: PHYSICIAN ASSISTANT

## 2024-01-01 PROCEDURE — 84466 ASSAY OF TRANSFERRIN: CPT | Performed by: PHYSICIAN ASSISTANT

## 2024-01-01 PROCEDURE — 93970 EXTREMITY STUDY: CPT | Performed by: INTERNAL MEDICINE

## 2024-01-01 PROCEDURE — 92950 HEART/LUNG RESUSCITATION CPR: CPT

## 2024-01-01 PROCEDURE — 25010000002 PIPERACILLIN SOD-TAZOBACTAM PER 1 G: Performed by: PHYSICIAN ASSISTANT

## 2024-01-01 PROCEDURE — 82375 ASSAY CARBOXYHB QUANT: CPT

## 2024-01-01 PROCEDURE — 85027 COMPLETE CBC AUTOMATED: CPT | Performed by: INTERNAL MEDICINE

## 2024-01-01 PROCEDURE — 84484 ASSAY OF TROPONIN QUANT: CPT | Performed by: EMERGENCY MEDICINE

## 2024-01-01 PROCEDURE — 93005 ELECTROCARDIOGRAM TRACING: CPT | Performed by: EMERGENCY MEDICINE

## 2024-01-01 PROCEDURE — 25010000002 MORPHINE PER 10 MG: Performed by: PHYSICAL MEDICINE & REHABILITATION

## 2024-01-01 PROCEDURE — 80053 COMPREHEN METABOLIC PANEL: CPT | Performed by: EMERGENCY MEDICINE

## 2024-01-01 PROCEDURE — 93306 TTE W/DOPPLER COMPLETE: CPT

## 2024-01-01 PROCEDURE — 85025 COMPLETE CBC W/AUTO DIFF WBC: CPT | Performed by: EMERGENCY MEDICINE

## 2024-01-01 PROCEDURE — 83050 HGB METHEMOGLOBIN QUAN: CPT

## 2024-01-01 PROCEDURE — 83605 ASSAY OF LACTIC ACID: CPT | Performed by: INTERNAL MEDICINE

## 2024-01-01 PROCEDURE — 36600 WITHDRAWAL OF ARTERIAL BLOOD: CPT

## 2024-01-01 PROCEDURE — 82728 ASSAY OF FERRITIN: CPT | Performed by: PHYSICIAN ASSISTANT

## 2024-01-01 PROCEDURE — 99232 SBSQ HOSP IP/OBS MODERATE 35: CPT | Performed by: INTERNAL MEDICINE

## 2024-01-01 PROCEDURE — 80053 COMPREHEN METABOLIC PANEL: CPT | Performed by: PHYSICIAN ASSISTANT

## 2024-01-01 PROCEDURE — 25010000002 FUROSEMIDE PER 20 MG: Performed by: EMERGENCY MEDICINE

## 2024-01-01 PROCEDURE — 83880 ASSAY OF NATRIURETIC PEPTIDE: CPT | Performed by: EMERGENCY MEDICINE

## 2024-01-01 PROCEDURE — 25010000002 MAGNESIUM SULFATE 2 GM/50ML SOLUTION: Performed by: NURSE PRACTITIONER

## 2024-01-01 PROCEDURE — 99222 1ST HOSP IP/OBS MODERATE 55: CPT | Performed by: INTERNAL MEDICINE

## 2024-01-01 PROCEDURE — 83540 ASSAY OF IRON: CPT | Performed by: PHYSICIAN ASSISTANT

## 2024-01-01 PROCEDURE — 85610 PROTHROMBIN TIME: CPT | Performed by: NURSE PRACTITIONER

## 2024-01-01 PROCEDURE — 94660 CPAP INITIATION&MGMT: CPT

## 2024-01-01 PROCEDURE — 25010000002 ALTEPLASE 2 MG RECONSTITUTED SOLUTION 1 EACH VIAL: Performed by: PHYSICIAN ASSISTANT

## 2024-01-01 PROCEDURE — 84443 ASSAY THYROID STIM HORMONE: CPT | Performed by: PHYSICIAN ASSISTANT

## 2024-01-01 PROCEDURE — 92610 EVALUATE SWALLOWING FUNCTION: CPT

## 2024-01-01 RX ORDER — FLUCONAZOLE 150 MG/1
150 TABLET ORAL
COMMUNITY

## 2024-01-01 RX ORDER — POLYETHYLENE GLYCOL 3350 17 G/17G
17 POWDER, FOR SOLUTION ORAL DAILY PRN
Status: DISCONTINUED | OUTPATIENT
Start: 2024-01-01 | End: 2024-01-01

## 2024-01-01 RX ORDER — AMOXICILLIN 250 MG
2 CAPSULE ORAL 2 TIMES DAILY PRN
Status: DISCONTINUED | OUTPATIENT
Start: 2024-01-01 | End: 2024-01-01

## 2024-01-01 RX ORDER — HYDRALAZINE HYDROCHLORIDE 10 MG/1
10 TABLET, FILM COATED ORAL 2 TIMES DAILY PRN
COMMUNITY

## 2024-01-01 RX ORDER — MEMANTINE HYDROCHLORIDE 10 MG/1
5 TABLET ORAL 2 TIMES DAILY
Status: DISCONTINUED | OUTPATIENT
Start: 2024-01-01 | End: 2024-01-01

## 2024-01-01 RX ORDER — FUROSEMIDE 10 MG/ML
40 INJECTION INTRAMUSCULAR; INTRAVENOUS EVERY 12 HOURS
Status: DISCONTINUED | OUTPATIENT
Start: 2024-01-01 | End: 2024-09-18 | Stop reason: HOSPADM

## 2024-01-01 RX ORDER — NYSTATIN 100000 [USP'U]/ML
500000 SUSPENSION ORAL 3 TIMES DAILY
COMMUNITY

## 2024-01-01 RX ORDER — ACETAMINOPHEN 325 MG/1
650 TABLET ORAL EVERY 6 HOURS PRN
COMMUNITY

## 2024-01-01 RX ORDER — SACCHAROMYCES BOULARDII 250 MG
250 CAPSULE ORAL 2 TIMES DAILY
COMMUNITY

## 2024-01-01 RX ORDER — SACCHAROMYCES BOULARDII 250 MG
250 CAPSULE ORAL 2 TIMES DAILY
Status: DISCONTINUED | OUTPATIENT
Start: 2024-01-01 | End: 2024-01-01

## 2024-01-01 RX ORDER — FLUCONAZOLE 100 MG/1
150 TABLET ORAL ONCE
Status: DISCONTINUED | OUTPATIENT
Start: 2024-01-01 | End: 2024-01-01

## 2024-01-01 RX ORDER — IPRATROPIUM BROMIDE AND ALBUTEROL SULFATE 2.5; .5 MG/3ML; MG/3ML
3 SOLUTION RESPIRATORY (INHALATION) EVERY 6 HOURS PRN
COMMUNITY

## 2024-01-01 RX ORDER — SODIUM CHLORIDE 0.9 % (FLUSH) 0.9 %
10 SYRINGE (ML) INJECTION AS NEEDED
Status: DISCONTINUED | OUTPATIENT
Start: 2024-01-01 | End: 2024-09-18 | Stop reason: HOSPADM

## 2024-01-01 RX ORDER — MAGNESIUM SULFATE HEPTAHYDRATE 40 MG/ML
2 INJECTION, SOLUTION INTRAVENOUS ONCE
Status: COMPLETED | OUTPATIENT
Start: 2024-01-01 | End: 2024-01-01

## 2024-01-01 RX ORDER — ACETAMINOPHEN 160 MG/5ML
650 SOLUTION ORAL EVERY 4 HOURS PRN
Status: DISCONTINUED | OUTPATIENT
Start: 2024-01-01 | End: 2024-01-01

## 2024-01-01 RX ORDER — POLYETHYLENE GLYCOL 3350 17 G/17G
17 POWDER, FOR SOLUTION ORAL DAILY
COMMUNITY

## 2024-01-01 RX ORDER — FUROSEMIDE 10 MG/ML
40 INJECTION INTRAMUSCULAR; INTRAVENOUS ONCE
Status: COMPLETED | OUTPATIENT
Start: 2024-01-01 | End: 2024-01-01

## 2024-01-01 RX ORDER — METOPROLOL TARTRATE 1 MG/ML
2.5 INJECTION, SOLUTION INTRAVENOUS ONCE
Status: COMPLETED | OUTPATIENT
Start: 2024-01-01 | End: 2024-01-01

## 2024-01-01 RX ORDER — SODIUM CHLORIDE 9 MG/ML
40 INJECTION, SOLUTION INTRAVENOUS AS NEEDED
Status: DISCONTINUED | OUTPATIENT
Start: 2024-01-01 | End: 2024-09-18 | Stop reason: HOSPADM

## 2024-01-01 RX ORDER — POTASSIUM CHLORIDE 750 MG/1
40 TABLET, EXTENDED RELEASE ORAL DAILY
COMMUNITY

## 2024-01-01 RX ORDER — ONDANSETRON 2 MG/ML
4 INJECTION INTRAMUSCULAR; INTRAVENOUS EVERY 6 HOURS PRN
Status: DISCONTINUED | OUTPATIENT
Start: 2024-01-01 | End: 2024-09-18 | Stop reason: HOSPADM

## 2024-01-01 RX ORDER — DIGOXIN 0.25 MG/ML
125 INJECTION INTRAMUSCULAR; INTRAVENOUS ONCE
Status: COMPLETED | OUTPATIENT
Start: 2024-01-01 | End: 2024-01-01

## 2024-01-01 RX ORDER — IOPAMIDOL 755 MG/ML
100 INJECTION, SOLUTION INTRAVASCULAR
Status: COMPLETED | OUTPATIENT
Start: 2024-01-01 | End: 2024-01-01

## 2024-01-01 RX ORDER — HYDRALAZINE HYDROCHLORIDE 20 MG/ML
20 INJECTION INTRAMUSCULAR; INTRAVENOUS ONCE
Status: DISCONTINUED | OUTPATIENT
Start: 2024-01-01 | End: 2024-01-01

## 2024-01-01 RX ORDER — MIDAZOLAM HYDROCHLORIDE 1 MG/ML
0.5 INJECTION INTRAMUSCULAR; INTRAVENOUS EVERY 4 HOURS PRN
Status: DISCONTINUED | OUTPATIENT
Start: 2024-01-01 | End: 2024-09-18 | Stop reason: HOSPADM

## 2024-01-01 RX ORDER — METHOCARBAMOL 750 MG/1
750 TABLET, FILM COATED ORAL EVERY 6 HOURS PRN
Status: DISCONTINUED | OUTPATIENT
Start: 2024-01-01 | End: 2024-01-01

## 2024-01-01 RX ORDER — FUROSEMIDE 20 MG
20 TABLET ORAL DAILY
COMMUNITY

## 2024-01-01 RX ORDER — ASPIRIN 81 MG/1
324 TABLET, CHEWABLE ORAL ONCE
Status: DISCONTINUED | OUTPATIENT
Start: 2024-01-01 | End: 2024-01-01

## 2024-01-01 RX ORDER — METOPROLOL SUCCINATE 50 MG/1
50 TABLET, EXTENDED RELEASE ORAL
Status: DISCONTINUED | OUTPATIENT
Start: 2024-01-01 | End: 2024-01-01

## 2024-01-01 RX ORDER — BISACODYL 5 MG/1
5 TABLET, DELAYED RELEASE ORAL DAILY PRN
Status: DISCONTINUED | OUTPATIENT
Start: 2024-01-01 | End: 2024-01-01

## 2024-01-01 RX ORDER — CALCIUM CARBONATE 500 MG/1
2 TABLET, CHEWABLE ORAL DAILY
COMMUNITY

## 2024-01-01 RX ORDER — ACETAMINOPHEN 650 MG/1
650 SUPPOSITORY RECTAL EVERY 4 HOURS PRN
Status: DISCONTINUED | OUTPATIENT
Start: 2024-01-01 | End: 2024-01-01

## 2024-01-01 RX ORDER — FLUCONAZOLE 100 MG/1
150 TABLET ORAL ONCE
Status: COMPLETED | OUTPATIENT
Start: 2024-01-01 | End: 2024-01-01

## 2024-01-01 RX ORDER — HEPARIN SODIUM 5000 [USP'U]/ML
5000 INJECTION, SOLUTION INTRAVENOUS; SUBCUTANEOUS EVERY 12 HOURS SCHEDULED
Status: DISCONTINUED | OUTPATIENT
Start: 2024-01-01 | End: 2024-01-01

## 2024-01-01 RX ORDER — DIGOXIN 0.25 MG/ML
250 INJECTION INTRAMUSCULAR; INTRAVENOUS ONCE
Status: COMPLETED | OUTPATIENT
Start: 2024-01-01 | End: 2024-01-01

## 2024-01-01 RX ORDER — LEVOTHYROXINE SODIUM 125 UG/1
125 TABLET ORAL
Status: DISCONTINUED | OUTPATIENT
Start: 2024-01-01 | End: 2024-01-01

## 2024-01-01 RX ORDER — IOPAMIDOL 755 MG/ML
150 INJECTION, SOLUTION INTRAVASCULAR
Status: COMPLETED | OUTPATIENT
Start: 2024-01-01 | End: 2024-01-01

## 2024-01-01 RX ORDER — METHOCARBAMOL 750 MG/1
750 TABLET, FILM COATED ORAL EVERY 6 HOURS PRN
COMMUNITY

## 2024-01-01 RX ORDER — VERAPAMIL HYDROCHLORIDE 240 MG/1
240 TABLET, FILM COATED, EXTENDED RELEASE ORAL DAILY
Status: DISCONTINUED | OUTPATIENT
Start: 2024-01-01 | End: 2024-01-01

## 2024-01-01 RX ORDER — PANTOPRAZOLE SODIUM 40 MG/1
40 TABLET, DELAYED RELEASE ORAL DAILY
Status: DISCONTINUED | OUTPATIENT
Start: 2024-01-01 | End: 2024-09-18 | Stop reason: HOSPADM

## 2024-01-01 RX ORDER — OXYBUTYNIN CHLORIDE 10 MG/1
10 TABLET, EXTENDED RELEASE ORAL DAILY
Status: DISCONTINUED | OUTPATIENT
Start: 2024-01-01 | End: 2024-01-01

## 2024-01-01 RX ORDER — SERTRALINE HYDROCHLORIDE 100 MG/1
100 TABLET, FILM COATED ORAL DAILY
Status: DISCONTINUED | OUTPATIENT
Start: 2024-01-01 | End: 2024-01-01

## 2024-01-01 RX ORDER — ACETAMINOPHEN 325 MG/1
650 TABLET ORAL EVERY 4 HOURS PRN
Status: DISCONTINUED | OUTPATIENT
Start: 2024-01-01 | End: 2024-01-01

## 2024-01-01 RX ORDER — BISACODYL 10 MG
10 SUPPOSITORY, RECTAL RECTAL DAILY PRN
Status: DISCONTINUED | OUTPATIENT
Start: 2024-01-01 | End: 2024-01-01

## 2024-01-01 RX ORDER — SODIUM CHLORIDE 0.9 % (FLUSH) 0.9 %
10 SYRINGE (ML) INJECTION EVERY 12 HOURS SCHEDULED
Status: DISCONTINUED | OUTPATIENT
Start: 2024-01-01 | End: 2024-09-18 | Stop reason: HOSPADM

## 2024-01-01 RX ORDER — MORPHINE SULFATE 2 MG/ML
2 INJECTION, SOLUTION INTRAMUSCULAR; INTRAVENOUS
Status: DISCONTINUED | OUTPATIENT
Start: 2024-01-01 | End: 2024-09-18 | Stop reason: HOSPADM

## 2024-01-01 RX ADMIN — HEPARIN SODIUM 5000 UNITS: 5000 INJECTION INTRAVENOUS; SUBCUTANEOUS at 21:11

## 2024-01-01 RX ADMIN — ALTEPLASE: 2.2 INJECTION, POWDER, LYOPHILIZED, FOR SOLUTION INTRAVENOUS at 01:37

## 2024-01-01 RX ADMIN — Medication 10 ML: at 11:53

## 2024-01-01 RX ADMIN — MORPHINE SULFATE 2 MG: 2 INJECTION, SOLUTION INTRAMUSCULAR; INTRAVENOUS at 13:07

## 2024-01-01 RX ADMIN — DIGOXIN 250 MCG: 0.25 INJECTION INTRAMUSCULAR; INTRAVENOUS at 14:34

## 2024-01-01 RX ADMIN — APIXABAN 2.5 MG: 2.5 TABLET, FILM COATED ORAL at 10:13

## 2024-01-01 RX ADMIN — IOPAMIDOL 80 ML: 755 INJECTION, SOLUTION INTRAVENOUS at 19:40

## 2024-01-01 RX ADMIN — FUROSEMIDE 40 MG: 10 INJECTION, SOLUTION INTRAMUSCULAR; INTRAVENOUS at 03:48

## 2024-01-01 RX ADMIN — LEVOTHYROXINE SODIUM 125 MCG: 125 TABLET ORAL at 03:48

## 2024-01-01 RX ADMIN — OXYBUTYNIN CHLORIDE 10 MG: 10 TABLET, EXTENDED RELEASE ORAL at 10:09

## 2024-01-01 RX ADMIN — PANTOPRAZOLE SODIUM 40 MG: 40 TABLET, DELAYED RELEASE ORAL at 10:09

## 2024-01-01 RX ADMIN — FUROSEMIDE 40 MG: 10 INJECTION, SOLUTION INTRAMUSCULAR; INTRAVENOUS at 20:02

## 2024-01-01 RX ADMIN — Medication 250 MG: at 21:12

## 2024-01-01 RX ADMIN — FLUCONAZOLE 150 MG: 100 TABLET ORAL at 16:45

## 2024-01-01 RX ADMIN — METOPROLOL SUCCINATE 50 MG: 50 TABLET, EXTENDED RELEASE ORAL at 09:45

## 2024-01-01 RX ADMIN — Medication 5 MG: at 23:02

## 2024-01-01 RX ADMIN — PIPERACILLIN AND TAZOBACTAM 3.38 G: 3; .375 INJECTION, POWDER, LYOPHILIZED, FOR SOLUTION INTRAVENOUS at 22:11

## 2024-01-01 RX ADMIN — PIPERACILLIN AND TAZOBACTAM 3.38 G: 3; .375 INJECTION, POWDER, LYOPHILIZED, FOR SOLUTION INTRAVENOUS at 17:21

## 2024-01-01 RX ADMIN — FUROSEMIDE 40 MG: 10 INJECTION, SOLUTION INTRAMUSCULAR; INTRAVENOUS at 14:34

## 2024-01-01 RX ADMIN — LEVOTHYROXINE SODIUM 125 MCG: 125 TABLET ORAL at 05:37

## 2024-01-01 RX ADMIN — METOPROLOL SUCCINATE 50 MG: 50 TABLET, EXTENDED RELEASE ORAL at 10:13

## 2024-01-01 RX ADMIN — PIPERACILLIN AND TAZOBACTAM 3.38 G: 3; .375 INJECTION, POWDER, LYOPHILIZED, FOR SOLUTION INTRAVENOUS at 03:48

## 2024-01-01 RX ADMIN — PIPERACILLIN AND TAZOBACTAM 3.38 G: 3; .375 INJECTION, POWDER, LYOPHILIZED, FOR SOLUTION INTRAVENOUS at 04:36

## 2024-01-01 RX ADMIN — MEMANTINE 5 MG: 10 TABLET ORAL at 23:03

## 2024-01-01 RX ADMIN — MEMANTINE 5 MG: 10 TABLET ORAL at 21:12

## 2024-01-01 RX ADMIN — MEMANTINE 5 MG: 10 TABLET ORAL at 09:45

## 2024-01-01 RX ADMIN — SERTRALINE 100 MG: 100 TABLET, FILM COATED ORAL at 10:13

## 2024-01-01 RX ADMIN — HEPARIN SODIUM 5000 UNITS: 5000 INJECTION INTRAVENOUS; SUBCUTANEOUS at 23:02

## 2024-01-01 RX ADMIN — Medication 10 ML: at 10:14

## 2024-01-01 RX ADMIN — Medication 10 ML: at 23:14

## 2024-01-01 RX ADMIN — EPINEPHRINE 1 MG: 0.1 INJECTION INTRAVENOUS at 11:36

## 2024-01-01 RX ADMIN — METOPROLOL TARTRATE 2.5 MG: 5 INJECTION INTRAVENOUS at 20:03

## 2024-01-01 RX ADMIN — Medication 250 MG: at 23:02

## 2024-01-01 RX ADMIN — MIDAZOLAM HYDROCHLORIDE 0.5 MG: 1 INJECTION, SOLUTION INTRAMUSCULAR; INTRAVENOUS at 13:07

## 2024-01-01 RX ADMIN — MEMANTINE 5 MG: 10 TABLET ORAL at 10:12

## 2024-01-01 RX ADMIN — SULFUR HEXAFLUORIDE 2 ML: KIT at 09:50

## 2024-01-01 RX ADMIN — PIPERACILLIN AND TAZOBACTAM 3.38 G: 3; .375 INJECTION, POWDER, LYOPHILIZED, FOR SOLUTION INTRAVENOUS at 09:44

## 2024-01-01 RX ADMIN — METHOCARBAMOL 750 MG: 750 TABLET ORAL at 23:03

## 2024-01-01 RX ADMIN — MORPHINE SULFATE 2 MG: 2 INJECTION, SOLUTION INTRAMUSCULAR; INTRAVENOUS at 17:01

## 2024-01-01 RX ADMIN — MAGNESIUM SULFATE HEPTAHYDRATE 2 G: 40 INJECTION, SOLUTION INTRAVENOUS at 12:08

## 2024-01-01 RX ADMIN — OXYBUTYNIN CHLORIDE 10 MG: 10 TABLET, EXTENDED RELEASE ORAL at 09:45

## 2024-01-01 RX ADMIN — Medication 250 MG: at 09:45

## 2024-01-01 RX ADMIN — MORPHINE SULFATE 2 MG: 2 INJECTION, SOLUTION INTRAMUSCULAR; INTRAVENOUS at 15:17

## 2024-01-01 RX ADMIN — IOPAMIDOL 145 ML: 755 INJECTION, SOLUTION INTRAVENOUS at 12:03

## 2024-01-01 RX ADMIN — PIPERACILLIN AND TAZOBACTAM 3.38 G: 3; .375 INJECTION, POWDER, LYOPHILIZED, FOR SOLUTION INTRAVENOUS at 10:15

## 2024-01-01 RX ADMIN — SERTRALINE 100 MG: 100 TABLET, FILM COATED ORAL at 09:44

## 2024-01-01 RX ADMIN — PANTOPRAZOLE SODIUM 40 MG: 40 TABLET, DELAYED RELEASE ORAL at 09:45

## 2024-01-01 RX ADMIN — DIGOXIN 125 MCG: 250 INJECTION, SOLUTION INTRAMUSCULAR; INTRAVENOUS; PARENTERAL at 10:09

## 2024-01-01 RX ADMIN — HEPARIN SODIUM 5000 UNITS: 5000 INJECTION INTRAVENOUS; SUBCUTANEOUS at 09:46

## 2024-01-05 DIAGNOSIS — N39.41 URGE INCONTINENCE OF URINE: ICD-10-CM

## 2024-01-05 RX ORDER — OXYBUTYNIN CHLORIDE 10 MG/1
10 TABLET, EXTENDED RELEASE ORAL DAILY
Qty: 30 TABLET | Refills: 0 | Status: SHIPPED | OUTPATIENT
Start: 2024-01-05

## 2024-01-05 RX ORDER — SERTRALINE HYDROCHLORIDE 100 MG/1
100 TABLET, FILM COATED ORAL DAILY
Qty: 90 TABLET | Refills: 0 | Status: SHIPPED | OUTPATIENT
Start: 2024-01-05

## 2024-01-26 RX ORDER — MEMANTINE HYDROCHLORIDE 5 MG/1
5 TABLET ORAL 2 TIMES DAILY
Qty: 180 TABLET | Refills: 0 | Status: SHIPPED | OUTPATIENT
Start: 2024-01-26

## 2024-01-29 DIAGNOSIS — N39.41 URGE INCONTINENCE OF URINE: ICD-10-CM

## 2024-01-29 RX ORDER — OXYBUTYNIN CHLORIDE 10 MG/1
10 TABLET, EXTENDED RELEASE ORAL DAILY
Qty: 30 TABLET | Refills: 0 | Status: SHIPPED | OUTPATIENT
Start: 2024-01-29

## 2024-02-07 RX ORDER — LEVOTHYROXINE SODIUM 112 UG/1
112 TABLET ORAL DAILY
Qty: 90 TABLET | Refills: 0 | Status: SHIPPED | OUTPATIENT
Start: 2024-02-07

## 2024-02-16 ENCOUNTER — LAB (OUTPATIENT)
Dept: INTERNAL MEDICINE | Facility: CLINIC | Age: 89
End: 2024-02-16
Payer: MEDICARE

## 2024-02-16 ENCOUNTER — OFFICE VISIT (OUTPATIENT)
Dept: INTERNAL MEDICINE | Facility: CLINIC | Age: 89
End: 2024-02-16
Payer: MEDICARE

## 2024-02-16 VITALS
WEIGHT: 139 LBS | OXYGEN SATURATION: 99 % | HEIGHT: 65 IN | BODY MASS INDEX: 23.16 KG/M2 | HEART RATE: 87 BPM | SYSTOLIC BLOOD PRESSURE: 132 MMHG | DIASTOLIC BLOOD PRESSURE: 78 MMHG | TEMPERATURE: 98.2 F

## 2024-02-16 DIAGNOSIS — Z00.00 MEDICARE ANNUAL WELLNESS VISIT, SUBSEQUENT: Primary | ICD-10-CM

## 2024-02-16 DIAGNOSIS — F41.9 ANXIETY: ICD-10-CM

## 2024-02-16 DIAGNOSIS — E03.9 ACQUIRED HYPOTHYROIDISM: ICD-10-CM

## 2024-02-16 DIAGNOSIS — R12 HEARTBURN: ICD-10-CM

## 2024-02-16 DIAGNOSIS — F03.A0 MILD DEMENTIA WITHOUT BEHAVIORAL DISTURBANCE, PSYCHOTIC DISTURBANCE, MOOD DISTURBANCE, OR ANXIETY, UNSPECIFIED DEMENTIA TYPE: ICD-10-CM

## 2024-02-16 DIAGNOSIS — N39.41 URGE INCONTINENCE OF URINE: ICD-10-CM

## 2024-02-16 DIAGNOSIS — I10 ESSENTIAL HYPERTENSION: ICD-10-CM

## 2024-02-16 LAB
ALBUMIN SERPL-MCNC: 3.9 G/DL (ref 3.5–5.2)
ALBUMIN/GLOB SERPL: 1.6 G/DL
ALP SERPL-CCNC: 137 U/L (ref 39–117)
ALT SERPL W P-5'-P-CCNC: 8 U/L (ref 1–33)
ANION GAP SERPL CALCULATED.3IONS-SCNC: 13.4 MMOL/L (ref 5–15)
AST SERPL-CCNC: 17 U/L (ref 1–32)
BASOPHILS # BLD AUTO: 0.07 10*3/MM3 (ref 0–0.2)
BASOPHILS NFR BLD AUTO: 0.8 % (ref 0–1.5)
BILIRUB SERPL-MCNC: <0.2 MG/DL (ref 0–1.2)
BUN SERPL-MCNC: 15 MG/DL (ref 8–23)
BUN/CREAT SERPL: 19.7 (ref 7–25)
CALCIUM SPEC-SCNC: 8.6 MG/DL (ref 8.2–9.6)
CHLORIDE SERPL-SCNC: 98 MMOL/L (ref 98–107)
CHOLEST SERPL-MCNC: 221 MG/DL (ref 0–200)
CO2 SERPL-SCNC: 24.6 MMOL/L (ref 22–29)
CREAT SERPL-MCNC: 0.76 MG/DL (ref 0.57–1)
DEPRECATED RDW RBC AUTO: 48.7 FL (ref 37–54)
EGFRCR SERPLBLD CKD-EPI 2021: 73.6 ML/MIN/1.73
EOSINOPHIL # BLD AUTO: 0.25 10*3/MM3 (ref 0–0.4)
EOSINOPHIL NFR BLD AUTO: 2.7 % (ref 0.3–6.2)
ERYTHROCYTE [DISTWIDTH] IN BLOOD BY AUTOMATED COUNT: 14.1 % (ref 12.3–15.4)
GLOBULIN UR ELPH-MCNC: 2.4 GM/DL
GLUCOSE SERPL-MCNC: 80 MG/DL (ref 65–99)
HBA1C MFR BLD: 5.4 % (ref 4.8–5.6)
HCT VFR BLD AUTO: 35.8 % (ref 34–46.6)
HDLC SERPL-MCNC: 51 MG/DL (ref 40–60)
HGB BLD-MCNC: 12.1 G/DL (ref 12–15.9)
IMM GRANULOCYTES # BLD AUTO: 0.07 10*3/MM3 (ref 0–0.05)
IMM GRANULOCYTES NFR BLD AUTO: 0.8 % (ref 0–0.5)
LDLC SERPL CALC-MCNC: 153 MG/DL (ref 0–100)
LDLC/HDLC SERPL: 2.96 {RATIO}
LYMPHOCYTES # BLD AUTO: 1.2 10*3/MM3 (ref 0.7–3.1)
LYMPHOCYTES NFR BLD AUTO: 13.1 % (ref 19.6–45.3)
MCH RBC QN AUTO: 32.3 PG (ref 26.6–33)
MCHC RBC AUTO-ENTMCNC: 33.8 G/DL (ref 31.5–35.7)
MCV RBC AUTO: 95.5 FL (ref 79–97)
MONOCYTES # BLD AUTO: 1.02 10*3/MM3 (ref 0.1–0.9)
MONOCYTES NFR BLD AUTO: 11.1 % (ref 5–12)
NEUTROPHILS NFR BLD AUTO: 6.55 10*3/MM3 (ref 1.7–7)
NEUTROPHILS NFR BLD AUTO: 71.5 % (ref 42.7–76)
NRBC BLD AUTO-RTO: 0 /100 WBC (ref 0–0.2)
PLATELET # BLD AUTO: 274 10*3/MM3 (ref 140–450)
PMV BLD AUTO: 9.9 FL (ref 6–12)
POTASSIUM SERPL-SCNC: 4.4 MMOL/L (ref 3.5–5.2)
PROT SERPL-MCNC: 6.3 G/DL (ref 6–8.5)
RBC # BLD AUTO: 3.75 10*6/MM3 (ref 3.77–5.28)
SODIUM SERPL-SCNC: 136 MMOL/L (ref 136–145)
T4 FREE SERPL-MCNC: 1.4 NG/DL (ref 0.93–1.7)
TRIGL SERPL-MCNC: 95 MG/DL (ref 0–150)
TSH SERPL DL<=0.05 MIU/L-ACNC: 5.97 UIU/ML (ref 0.27–4.2)
VLDLC SERPL-MCNC: 17 MG/DL (ref 5–40)
WBC NRBC COR # BLD AUTO: 9.16 10*3/MM3 (ref 3.4–10.8)

## 2024-02-16 PROCEDURE — 84443 ASSAY THYROID STIM HORMONE: CPT | Performed by: NURSE PRACTITIONER

## 2024-02-16 PROCEDURE — 36415 COLL VENOUS BLD VENIPUNCTURE: CPT | Performed by: NURSE PRACTITIONER

## 2024-02-16 PROCEDURE — 80053 COMPREHEN METABOLIC PANEL: CPT | Performed by: NURSE PRACTITIONER

## 2024-02-16 PROCEDURE — 1160F RVW MEDS BY RX/DR IN RCRD: CPT | Performed by: NURSE PRACTITIONER

## 2024-02-16 PROCEDURE — 80061 LIPID PANEL: CPT | Performed by: NURSE PRACTITIONER

## 2024-02-16 PROCEDURE — 85025 COMPLETE CBC W/AUTO DIFF WBC: CPT | Performed by: NURSE PRACTITIONER

## 2024-02-16 PROCEDURE — 83036 HEMOGLOBIN GLYCOSYLATED A1C: CPT | Performed by: NURSE PRACTITIONER

## 2024-02-16 PROCEDURE — 84439 ASSAY OF FREE THYROXINE: CPT | Performed by: NURSE PRACTITIONER

## 2024-02-16 PROCEDURE — 1170F FXNL STATUS ASSESSED: CPT | Performed by: NURSE PRACTITIONER

## 2024-02-16 PROCEDURE — G0439 PPPS, SUBSEQ VISIT: HCPCS | Performed by: NURSE PRACTITIONER

## 2024-02-16 PROCEDURE — 1159F MED LIST DOCD IN RCRD: CPT | Performed by: NURSE PRACTITIONER

## 2024-02-16 RX ORDER — OXYBUTYNIN CHLORIDE 10 MG/1
10 TABLET, EXTENDED RELEASE ORAL DAILY
Qty: 90 TABLET | Refills: 4 | Status: SHIPPED | OUTPATIENT
Start: 2024-02-16

## 2024-02-16 RX ORDER — SERTRALINE HYDROCHLORIDE 100 MG/1
100 TABLET, FILM COATED ORAL DAILY
Qty: 90 TABLET | Refills: 4 | Status: SHIPPED | OUTPATIENT
Start: 2024-02-16

## 2024-02-16 RX ORDER — LEVOTHYROXINE SODIUM 112 UG/1
112 TABLET ORAL DAILY
Qty: 90 TABLET | Refills: 4 | Status: SHIPPED | OUTPATIENT
Start: 2024-02-16 | End: 2024-02-19 | Stop reason: DRUGHIGH

## 2024-02-16 RX ORDER — OMEPRAZOLE 20 MG/1
20 CAPSULE, DELAYED RELEASE ORAL DAILY
Qty: 90 CAPSULE | Refills: 4 | Status: SHIPPED | OUTPATIENT
Start: 2024-02-16

## 2024-02-16 RX ORDER — VERAPAMIL HYDROCHLORIDE 240 MG/1
240 TABLET, FILM COATED, EXTENDED RELEASE ORAL DAILY
Qty: 90 TABLET | Refills: 4 | Status: SHIPPED | OUTPATIENT
Start: 2024-02-16

## 2024-02-19 DIAGNOSIS — R74.8 ELEVATED ALKALINE PHOSPHATASE LEVEL: ICD-10-CM

## 2024-02-19 DIAGNOSIS — E03.9 ACQUIRED HYPOTHYROIDISM: Primary | ICD-10-CM

## 2024-02-19 RX ORDER — LEVOTHYROXINE SODIUM 0.12 MG/1
125 TABLET ORAL DAILY
Qty: 30 TABLET | Refills: 3 | Status: SHIPPED | OUTPATIENT
Start: 2024-02-19

## 2024-04-25 RX ORDER — MEMANTINE HYDROCHLORIDE 5 MG/1
5 TABLET ORAL 2 TIMES DAILY
Qty: 180 TABLET | Refills: 0 | Status: SHIPPED | OUTPATIENT
Start: 2024-04-25

## 2024-07-03 DIAGNOSIS — E03.9 ACQUIRED HYPOTHYROIDISM: ICD-10-CM

## 2024-07-03 RX ORDER — LEVOTHYROXINE SODIUM 0.12 MG/1
125 TABLET ORAL DAILY
Qty: 90 TABLET | Refills: 0 | Status: SHIPPED | OUTPATIENT
Start: 2024-07-03

## 2024-07-10 ENCOUNTER — APPOINTMENT (OUTPATIENT)
Facility: HOSPITAL | Age: 89
End: 2024-07-10
Payer: MEDICARE

## 2024-07-10 ENCOUNTER — HOSPITAL ENCOUNTER (EMERGENCY)
Facility: HOSPITAL | Age: 89
Discharge: OTHER INSTITUTION | End: 2024-07-11
Attending: EMERGENCY MEDICINE
Payer: MEDICARE

## 2024-07-10 DIAGNOSIS — S32.431A CLOSED DISPLACED FRACTURE OF ANTERIOR COLUMN OF RIGHT ACETABULUM, INITIAL ENCOUNTER: Primary | ICD-10-CM

## 2024-07-10 DIAGNOSIS — N39.0 COMPLICATED URINARY TRACT INFECTION: ICD-10-CM

## 2024-07-10 LAB
ALBUMIN SERPL-MCNC: 4.3 G/DL (ref 3.5–5.2)
ALBUMIN/GLOB SERPL: 1.5 G/DL
ALP SERPL-CCNC: 142 U/L (ref 39–117)
ALT SERPL W P-5'-P-CCNC: 8 U/L (ref 1–33)
ANION GAP SERPL CALCULATED.3IONS-SCNC: 11.5 MMOL/L (ref 5–15)
AST SERPL-CCNC: 19 U/L (ref 1–32)
BASOPHILS # BLD AUTO: 0.03 10*3/MM3 (ref 0–0.2)
BASOPHILS NFR BLD AUTO: 0.3 % (ref 0–1.5)
BILIRUB SERPL-MCNC: 0.3 MG/DL (ref 0–1.2)
BILIRUB UR QL STRIP: NEGATIVE
BUN SERPL-MCNC: 17 MG/DL (ref 8–23)
BUN/CREAT SERPL: 20.7 (ref 7–25)
CALCIUM SPEC-SCNC: 8.8 MG/DL (ref 8.2–9.6)
CHLORIDE SERPL-SCNC: 96 MMOL/L (ref 98–107)
CLARITY UR: CLEAR
CO2 SERPL-SCNC: 27.5 MMOL/L (ref 22–29)
COLOR UR: YELLOW
CREAT SERPL-MCNC: 0.82 MG/DL (ref 0.57–1)
DEPRECATED RDW RBC AUTO: 49.5 FL (ref 37–54)
EGFRCR SERPLBLD CKD-EPI 2021: 66.8 ML/MIN/1.73
EOSINOPHIL # BLD AUTO: 0.09 10*3/MM3 (ref 0–0.4)
EOSINOPHIL NFR BLD AUTO: 0.8 % (ref 0.3–6.2)
ERYTHROCYTE [DISTWIDTH] IN BLOOD BY AUTOMATED COUNT: 14 % (ref 12.3–15.4)
FLUAV RNA RESP QL NAA+PROBE: NOT DETECTED
FLUBV RNA RESP QL NAA+PROBE: NOT DETECTED
GLOBULIN UR ELPH-MCNC: 2.9 GM/DL
GLUCOSE SERPL-MCNC: 119 MG/DL (ref 65–99)
GLUCOSE UR STRIP-MCNC: NEGATIVE MG/DL
HCT VFR BLD AUTO: 37 % (ref 34–46.6)
HGB BLD-MCNC: 12.2 G/DL (ref 12–15.9)
HGB UR QL STRIP.AUTO: NEGATIVE
IMM GRANULOCYTES # BLD AUTO: 0.15 10*3/MM3 (ref 0–0.05)
IMM GRANULOCYTES NFR BLD AUTO: 1.3 % (ref 0–0.5)
KETONES UR QL STRIP: NEGATIVE
LEUKOCYTE ESTERASE UR QL STRIP.AUTO: NEGATIVE
LYMPHOCYTES # BLD AUTO: 0.87 10*3/MM3 (ref 0.7–3.1)
LYMPHOCYTES NFR BLD AUTO: 7.3 % (ref 19.6–45.3)
MCH RBC QN AUTO: 31.4 PG (ref 26.6–33)
MCHC RBC AUTO-ENTMCNC: 33 G/DL (ref 31.5–35.7)
MCV RBC AUTO: 95.4 FL (ref 79–97)
MONOCYTES # BLD AUTO: 1.14 10*3/MM3 (ref 0.1–0.9)
MONOCYTES NFR BLD AUTO: 9.5 % (ref 5–12)
NEUTROPHILS NFR BLD AUTO: 80.8 % (ref 42.7–76)
NEUTROPHILS NFR BLD AUTO: 9.69 10*3/MM3 (ref 1.7–7)
NITRITE UR QL STRIP: POSITIVE
PH UR STRIP.AUTO: 7 [PH] (ref 5–8)
PLATELET # BLD AUTO: 242 10*3/MM3 (ref 140–450)
PMV BLD AUTO: 9.6 FL (ref 6–12)
POTASSIUM SERPL-SCNC: 4.3 MMOL/L (ref 3.5–5.2)
PROT SERPL-MCNC: 7.2 G/DL (ref 6–8.5)
PROT UR QL STRIP: NEGATIVE
RBC # BLD AUTO: 3.88 10*6/MM3 (ref 3.77–5.28)
RSV RNA RESP QL NAA+PROBE: NOT DETECTED
SARS-COV-2 RNA RESP QL NAA+PROBE: NOT DETECTED
SODIUM SERPL-SCNC: 135 MMOL/L (ref 136–145)
SP GR UR STRIP: 1.02 (ref 1–1.03)
T4 FREE SERPL-MCNC: 1.36 NG/DL (ref 0.92–1.68)
TSH SERPL DL<=0.05 MIU/L-ACNC: 7.61 UIU/ML (ref 0.27–4.2)
UROBILINOGEN UR QL STRIP: ABNORMAL
WBC NRBC COR # BLD AUTO: 11.97 10*3/MM3 (ref 3.4–10.8)

## 2024-07-10 PROCEDURE — 36415 COLL VENOUS BLD VENIPUNCTURE: CPT

## 2024-07-10 PROCEDURE — 73502 X-RAY EXAM HIP UNI 2-3 VIEWS: CPT

## 2024-07-10 PROCEDURE — 96375 TX/PRO/DX INJ NEW DRUG ADDON: CPT

## 2024-07-10 PROCEDURE — 80053 COMPREHEN METABOLIC PANEL: CPT | Performed by: EMERGENCY MEDICINE

## 2024-07-10 PROCEDURE — 81001 URINALYSIS AUTO W/SCOPE: CPT | Performed by: EMERGENCY MEDICINE

## 2024-07-10 PROCEDURE — 25010000002 MORPHINE PER 10 MG: Performed by: EMERGENCY MEDICINE

## 2024-07-10 PROCEDURE — 87637 SARSCOV2&INF A&B&RSV AMP PRB: CPT | Performed by: EMERGENCY MEDICINE

## 2024-07-10 PROCEDURE — 99285 EMERGENCY DEPT VISIT HI MDM: CPT

## 2024-07-10 PROCEDURE — 85025 COMPLETE CBC W/AUTO DIFF WBC: CPT | Performed by: EMERGENCY MEDICINE

## 2024-07-10 PROCEDURE — 71045 X-RAY EXAM CHEST 1 VIEW: CPT

## 2024-07-10 PROCEDURE — 84443 ASSAY THYROID STIM HORMONE: CPT | Performed by: EMERGENCY MEDICINE

## 2024-07-10 PROCEDURE — 72192 CT PELVIS W/O DYE: CPT

## 2024-07-10 PROCEDURE — 84439 ASSAY OF FREE THYROXINE: CPT | Performed by: EMERGENCY MEDICINE

## 2024-07-10 PROCEDURE — 70450 CT HEAD/BRAIN W/O DYE: CPT

## 2024-07-10 RX ADMIN — MORPHINE SULFATE 4 MG: 4 INJECTION, SOLUTION INTRAMUSCULAR; INTRAVENOUS at 23:02

## 2024-07-11 VITALS
HEIGHT: 65 IN | TEMPERATURE: 97.9 F | HEART RATE: 107 BPM | OXYGEN SATURATION: 93 % | BODY MASS INDEX: 23.88 KG/M2 | RESPIRATION RATE: 18 BRPM | WEIGHT: 143.3 LBS | DIASTOLIC BLOOD PRESSURE: 95 MMHG | SYSTOLIC BLOOD PRESSURE: 159 MMHG

## 2024-07-11 LAB
BACTERIA UR QL AUTO: ABNORMAL /HPF
HYALINE CASTS UR QL AUTO: ABNORMAL /LPF
RBC # UR STRIP: ABNORMAL /HPF
REF LAB TEST METHOD: ABNORMAL
SQUAMOUS #/AREA URNS HPF: ABNORMAL /HPF
WBC # UR STRIP: ABNORMAL /HPF

## 2024-07-11 PROCEDURE — 25010000002 MORPHINE PER 10 MG: Performed by: EMERGENCY MEDICINE

## 2024-07-11 PROCEDURE — 96365 THER/PROPH/DIAG IV INF INIT: CPT

## 2024-07-11 PROCEDURE — 25010000002 CEFTRIAXONE PER 250 MG: Performed by: EMERGENCY MEDICINE

## 2024-07-11 PROCEDURE — 96376 TX/PRO/DX INJ SAME DRUG ADON: CPT

## 2024-07-11 RX ADMIN — CEFTRIAXONE SODIUM 1000 MG: 1 INJECTION, POWDER, FOR SOLUTION INTRAMUSCULAR; INTRAVENOUS at 00:22

## 2024-07-11 RX ADMIN — MORPHINE SULFATE 4 MG: 4 INJECTION, SOLUTION INTRAMUSCULAR; INTRAVENOUS at 00:36

## 2024-07-11 NOTE — [facility] PROVIDER NOTE
Subjective  History of Present Illness:    Patient presents the emergency department with right hip pain after a fall from a chair.  Patient has been weak over the past week.  Patient initially complained of right hip pain.      Nurses Notes reviewed and agree, including vitals, allergies, social history and prior medical history.     REVIEW OF SYSTEMS: All systems reviewed and not pertinent unless noted.  Review of Systems   Unable to perform ROS: Dementia       Past Medical History:   Diagnosis Date    Allergic     Seasonal allergies    Anemia     Arthritis ?    Osteoarthritis - hands    Cancer     Colon polyp     Dementia     Diverticulosis     GERD (gastroesophageal reflux disease)     Headache     Hyperlipidemia     Hypertension     Hypothyroidism     Prediabetes        Allergies:    Patient has no known allergies.      Past Surgical History:   Procedure Laterality Date    BREAST SURGERY  2019    CHOLECYSTECTOMY      FEMUR FRACTURE SURGERY      HYSTERECTOMY      MASTECTOMY Left     REPLACEMENT TOTAL KNEE      THYROIDECTOMY      TOTAL HIP ARTHROPLASTY           Social History     Socioeconomic History    Marital status:    Tobacco Use    Smoking status: Former     Current packs/day: 0.00     Average packs/day: 0.1 packs/day for 10.0 years (1.0 ttl pk-yrs)     Types: Cigarettes     Start date: 1956     Quit date: 1966     Years since quittin.5    Smokeless tobacco: Never    Tobacco comments:     Light smoker - ~[id number]   Vaping Use    Vaping status: Never Used   Substance and Sexual Activity    Alcohol use: Not Currently     Comment: Recovering alcoholic    Drug use: Never         Family History   Problem Relation Age of Onset    Cancer Mother     Arthritis Mother     Arthritis Father     Lung cancer Sister     Cancer Son         , 2 years old    Diabetes Son     Diabetes Son         Type I       Objective  Physical Exam:  /95   Pulse 107   Temp 97.9 °F (36.6 °C)  "(Oral)   Resp 18   Ht 165 cm (64.96\")   Wt 65 kg (143 lb 4.8 oz)   SpO2 93%   BMI 23.87 kg/m²      Physical Exam  Vitals and nursing note reviewed.   Constitutional:       Appearance: Normal appearance.   HENT:      Right Ear: External ear normal.      Left Ear: External ear normal.      Nose: Nose normal.      Mouth/Throat:      Mouth: Mucous membranes are moist.   Eyes:      Extraocular Movements: Extraocular movements intact.   Cardiovascular:      Rate and Rhythm: Normal rate and regular rhythm.   Pulmonary:      Effort: Pulmonary effort is normal.      Breath sounds: Normal breath sounds.   Musculoskeletal:      Comments: Pain with roll of the right hip   Skin:     General: Skin is warm and dry.   Neurological:      General: No focal deficit present.      Mental Status: She is alert.   Psychiatric:         Mood and Affect: Mood normal.         Behavior: Behavior normal.         Thought Content: Thought content normal.         Procedures    ED Course:    ED Course as of 07/11/24 0115   Thu Jul 11, 2024   0042 Bacteria, UA(!): 3+ [MICKEY]   0042 WBC, UA(!): 3-5 [MICKEY]   0042 Bacteria, UA(!): 3+  C/w urinary tract infection   [MICKEY]      ED Course User Index  [MICKEY] Oh Frost MD       Lab Results (last 24 hours)       Procedure Component Value Units Date/Time    COVID-19, FLU A/B, RSV PCR 1 HR TAT - Swab, Nasopharynx [[id number]]  (Normal) Collected: 07/10/24 2220    Specimen: Swab from Nasopharynx Updated: 07/10/24 2307     COVID19 Not Detected     Influenza A PCR Not Detected     Influenza B PCR Not Detected     RSV, PCR Not Detected    Narrative:      Fact sheet for providers: https://www.fda.gov/media/981573/download    Fact sheet for patients: https://www.fda.gov/media/744967/download    Test performed by PCR.    CBC Auto Differential [[id number]]  (Abnormal) Collected: 07/10/24 2220    Specimen: Blood Updated: 07/10/24 2230     WBC 11.97 10*3/mm3      RBC 3.88 10*6/mm3      Hemoglobin 12.2 g/dL      Hematocrit " 37.0 %      MCV 95.4 fL      MCH 31.4 pg      MCHC 33.0 g/dL      RDW 14.0 %      RDW-SD 49.5 fl      MPV 9.6 fL      Platelets 242 10*3/mm3      Neutrophil % 80.8 %      Lymphocyte % 7.3 %      Monocyte % 9.5 %      Eosinophil % 0.8 %      Basophil % 0.3 %      Immature Grans % 1.3 %      Neutrophils, Absolute 9.69 10*3/mm3      Lymphocytes, Absolute 0.87 10*3/mm3      Monocytes, Absolute 1.14 10*3/mm3      Eosinophils, Absolute 0.09 10*3/mm3      Basophils, Absolute 0.03 10*3/mm3      Immature Grans, Absolute 0.15 10*3/mm3     Comprehensive Metabolic Panel [[id number]]  (Abnormal) Collected: 07/10/24 2220    Specimen: Blood Updated: 07/10/24 2250     Glucose 119 mg/dL      BUN 17 mg/dL      Creatinine 0.82 mg/dL      Sodium 135 mmol/L      Potassium 4.3 mmol/L      Chloride 96 mmol/L      CO2 27.5 mmol/L      Calcium 8.8 mg/dL      Total Protein 7.2 g/dL      Albumin 4.3 g/dL      ALT (SGPT) 8 U/L      AST (SGOT) 19 U/L      Alkaline Phosphatase 142 U/L      Total Bilirubin 0.3 mg/dL      Globulin 2.9 gm/dL      A/G Ratio 1.5 g/dL      BUN/Creatinine Ratio 20.7     Anion Gap 11.5 mmol/L      eGFR 66.8 mL/min/1.73     Narrative:      GFR Normal >60  Chronic Kidney Disease <60  Kidney Failure <15    The GFR formula is only valid for adults with stable renal function between ages 18 and 70.    TSH [[id number]]  (Abnormal) Collected: 07/10/24 2220    Specimen: Blood Updated: 07/10/24 2256     TSH 7.610 uIU/mL     T4, Free [[id number]]  (Normal) Collected: 07/10/24 2220    Specimen: Blood Updated: 07/10/24 2256     Free T4 1.36 ng/dL     Urinalysis With Microscopic If Indicated (No Culture) - Urine, Clean Catch [[id number]]  (Abnormal) Collected: 07/10/24 2325    Specimen: Urine, Clean Catch Updated: 07/10/24 2334     Color, UA Yellow     Appearance, UA Clear     pH, UA 7.0     Specific Gravity, UA 1.020     Glucose, UA Negative     Ketones, UA Negative     Bilirubin, UA Negative     Blood, UA Negative     Protein, UA  Negative     Leuk Esterase, UA Negative     Nitrite, UA Positive     Urobilinogen, UA 2.0 E.U./dL    Urinalysis, Microscopic Only - Urine, Clean Catch [[id number]]  (Abnormal) Collected: 07/10/24 2326    Specimen: Urine, Clean Catch Updated: 07/11/24 0016     RBC, UA 0-2 /HPF      WBC, UA 3-5 /HPF      Bacteria, UA 3+ /HPF      Squamous Epithelial Cells, UA 0-2 /HPF      Hyaline Casts, UA 0-2 /LPF      Methodology Manual Light Microscopy             CT Pelvis Without Contrast    Result Date: 7/10/2024  CT PELVIS WO CONTRAST Date of Exam: 7/10/2024 10:33 PM EDT Indication: hip pain after a fall. Comparison: Radiographs conducted 7/10/2024 Technique: Axial CT images were obtained of the pelvis without contrast administration.  Reconstructed coronal and sagittal images were also obtained. Automated exposure control and iterative construction methods were used. Findings: Remote postoperative and posttraumatic changes of the proximal left femur are noted. There is a nondisplaced fracture involving the right iliac bone extending through the acetabulum of the right hip. This finding is radiographically occult on the prior radiographs. The proximal right femur remains intact without evidence for fracture. The articulation of the right hip remains intact without dislocation. As noted on the radiographs, there are nondisplaced fractures involving the lateral aspect of the right superior pubic ramus and mid aspect of the right inferior pubic ramus. There is also fracture of the right pubic bone which is nondisplaced. The pubic  symphysis remains intact. Age-related changes are noted. The left pubic rami are intact. The sacral alae appear intact. The sacral neuroforamina are patent. The SI joints are intact. Age-related changes are noted. The coccyx appears intact. There is mild osteoarthritis of the bilateral hips. There is mild ecchymosis/hemorrhage within the iliacus musculature on the right adjacent to the right iliac bone.  No large hematoma is seen. A right hip joint effusion is observed. The myotendinous attachments associated with the hips and pelvis bilaterally are intact without avulsion or retraction. No acute abnormalities are noted involving the intraperitoneal soft tissues of the pelvis.     Impression: Impression: 1.Nondisplaced fracture involving the right iliac bone extending through the superior margin of the acetabulum. The fracture extends through the articular surface. There is no definitive fracture involving the proximal right femur. The articulation of the right hip remains intact. 2.Nondisplaced fractures involving the right superior and inferior pubic rami. There is also a nondisplaced fracture of the right pubic bone. 3.The SI joints and pubic symphysis remain intact. Age-related changes are noted. 4.Degenerative changes are seen throughout the pelvis. There is mild osteoarthritis of the bilateral hips. 5.Evidence for edema and hemorrhage within the iliacus musculature on the right adjacent to the right iliac bone. No large hematoma is seen. There is also a right hip joint effusion. 6.There is no evidence for musculotendinous tendinous avulsion or retraction. Electronically Signed: Gilmer Jordan MD  7/10/2024 10:55 PM EDT  Workstation ID: [id number]    XR Hip With or Without Pelvis 2 - 3 View Right    Result Date: 7/10/2024  XR HIP W OR WO PELVIS 2-3 VIEW RIGHT Date of Exam: 7/10/2024 9:55 PM EDT Indication: hip pain Comparison: None available. Findings: There is no displaced fracture of the proximal right femur. The articulation of the right hip remains intact. Mild changes of osteoarthritis are noted. There is irregularity of the superior and inferior pubic rami on the right indicating acute to subacute fractures. The pubic symphysis remains intact. Age-related changes are noted. The left pubic rami appear intact. The sacral alae are intact. The SI joints are intact. Age-related changes are noted. Remote  posttraumatic and postoperative changes of the proximal left femur are noted. Vascular calcifications are seen in the soft tissues.     Impression: Impression: 1.Evidence for acute to subacute nondisplaced fractures involving the right superior and inferior pubic rami. 2.The right hip appears intact. No definitive displaced fracture or dislocation is seen. 3.Mild degenerative changes are seen throughout the pelvis. There is mild osteoarthritis of the right hip. Electronically Signed: Gilmer Jordan MD  7/10/2024 10:11 PM EDT  Workstation ID: [id number]    XR Chest 1 View    Result Date: 7/10/2024  XR CHEST 1 VW Date of Exam: 7/10/2024 9:50 PM EDT Indication: cough Comparison: 2/20/2023 FINDINGS: No new consolidations or pleural effusions are observed. The cardiac silhouette and mediastinum are stable. No acute osseous abnormalities are identified.     Impression: There is no significant change when compared to the prior study. There is no evidence for acute cardiopulmonary process. Electronically Signed: Gilmer Jordan MD  7/10/2024 10:07 PM EDT  Workstation ID: [id number]    CT Head Without Contrast    Result Date: 7/10/2024  CT HEAD WO CONTRAST Date of Exam: 7/10/2024 9:45 PM EDT Indication: weak. Comparison: None available. Technique: Axial CT images were obtained of the head without contrast administration.  Automated exposure control and iterative construction methods were used. Findings: No intracranial hemorrhage. Gray-white matter differentiation is maintained without evidence of an acute infarction. Multiple foci of decreased attenuation are present within the subcortical, deep cerebral, and periventricular white matter consistent with chronic small vessel/microangiopathic ischemic changes. No extra-axial mass or collection. The ventricles and sulci are prominent commensurate with involutional changes. The posterior fossa appears grossly normal. Sellar and suprasellar structures are normal. Orbital and  periorbital soft tissues are normal. The paranasal sinuses, ethmoid air cells, and mastoid air cells are aerated. The bony calvarium is intact.     Impression: Impression: No acute intracranial pathology. Electronically Signed: Neymar Louie MD  7/10/2024 9:55 PM EDT  Workstation ID: [id number]        MDM     Amount and/or Complexity of Data Reviewed  Clinical lab tests: reviewed  Tests in the radiology section of CPT®: reviewed  Tests in the medicine section of CPT®: reviewed        Initial impression of presenting illness: Hip fracture    DDX: includes but is not limited to: Hip fracture, pelvic fracture, hip dislocation    Patient arrives by EMS with vitals interpreted by myself.     Pertinent features from physical exam: Tenderness to roll of the right lower extremity.    Initial diagnostic plan: CTs and labs    Results from initial plan were reviewed and interpreted by me revealing right acetabular fracture with urinary tract infection    Diagnostic information from other sources:     Interventions / Re-evaluation:     Medications   morphine injection 4 mg (4 mg Intravenous Given 7/10/24 2302)   cefTRIAXone (ROCEPHIN) 1,000 mg in sodium chloride 0.9 % 100 mL MBP (0 mg Intravenous Stopped 7/11/24 0038)   morphine injection 4 mg (4 mg Intravenous Given 7/11/24 0036)       Results/clinical rationale were discussed with patient, patient's son, and patient's daughter    Consultations/Discussion of results with other physicians: Sarah advises that patient needs to be transferred to  given the type of injury.  Dr. Rush accepts the patient in transfer    Data interpreted: Nursing notes reviewed, vital signs reviewed.  CBC with differential, CMP, and Urine analysis and gram, sensitivities and cultures CT of the head and Plain extremity XR's reviewed independently interpreted by me.  EKG independently interpreted by me.  O2 saturation:    Counseling: Discussed the results above with the patient regarding  need for transfer to [facility].  Patient understands and agrees plan of care.      -----  ED Disposition       ED Disposition   Transfer to Another Facility     Condition   --    Comment   --             Final diagnoses:   Closed displaced fracture of anterior column of right acetabulum, initial encounter   Complicated urinary tract infection     Your Follow-Up Providers    Follow-up information has not been specified.       Contact information for after-discharge care    Follow-up information has not been specified.          Your medication list        CONTINUE taking these medications        Instructions Last Dose Given Next Dose Due   cholecalciferol 25 MCG (1000 UT) tablet  Commonly known as: VITAMIN D3      Take 1 tablet by mouth Daily.       levothyroxine 125 MCG tablet  Commonly known as: SYNTHROID, LEVOTHROID      Take 1 tablet by mouth once daily       Melatonin 10 MG capsule      Take 1 capsule by mouth.       memantine 5 MG tablet  Commonly known as: NAMENDA      Take 1 tablet by mouth twice daily       MULTIVITAMIN ADULT EXTRA C PO      multivitamin       omeprazole 20 MG capsule  Commonly known as: priLOSEC      Take 1 capsule by mouth Daily.       oxybutynin XL 10 MG 24 hr tablet  Commonly known as: DITROPAN-XL      Take 1 tablet by mouth Daily.       sertraline 100 MG tablet  Commonly known as: ZOLOFT      Take 1 tablet by mouth Daily.       verapamil  MG CR tablet  Commonly known as: CALAN-SR      Take 1 tablet by mouth Daily.

## 2024-07-25 RX ORDER — MEMANTINE HYDROCHLORIDE 5 MG/1
5 TABLET ORAL 2 TIMES DAILY
Qty: 180 TABLET | Refills: 0 | Status: SHIPPED | OUTPATIENT
Start: 2024-07-25

## 2024-07-29 NOTE — PROGRESS NOTES
Nursing Home History and Physical Note      Bogdan DO Geovanni  [x]  [address] [facility], [address]  [place] [address]  Phone: [phone]  Fax: [phone]     PATIENT NAME: Jaqui Matute                                                                            YOB: 1931              DATE OF SERVICE: 07/30/2024    FACILITY:   [x] [facility] [place]   [] Other ______________     CHIEF COMPLAINT:   Status post recent ORIF of pelvic and acetabular fractures.      HISTORY OF PRESENT ILLNESS:   Patient is a 89-year-old female with past medical history of dementia, diverticulosis, hypertension, hyperlipidemia, hypothyroidism, prediabetes, breast cancer status post left mastectomy, depression, who presented to  with pelvic and acetabular fractures status post ORIF on 7/16 by Dr. Handy.  Then returns to the OR on 7/24 drain removal.  Was sent back to the [facility] for rehab when she started having increased bleeding at her incision sites.  Evaluated by orthopedics who did not feel there was any concern and has sent the patient back to rehab    PAST MEDICAL & SURGICAL HISTORY:   Past Medical History:   Diagnosis Date    Allergic 15MAR23    Seasonal allergies    Anemia     Arthritis 1995?    Osteoarthritis - hands    Cancer     Colon polyp     Dementia     Diverticulosis     GERD (gastroesophageal reflux disease)     Headache     Hyperlipidemia     Hypertension     Hypothyroidism     Prediabetes       Past Surgical History:   Procedure Laterality Date    BREAST SURGERY  2019    CHOLECYSTECTOMY      FEMUR FRACTURE SURGERY      HYSTERECTOMY      MASTECTOMY Left     REPLACEMENT TOTAL KNEE      THYROIDECTOMY      TOTAL HIP ARTHROPLASTY            MEDICATIONS:  I have reviewed and reconciled the patients medication list in the patients chart at the skilled nursing facility today.      ALLERGIES:  Reviewed  No Known Allergies      SOCIAL HISTORY:  Reviewed  Social History     Socioeconomic History    Marital  status:    Tobacco Use    Smoking status: Former     Current packs/day: 0.00     Average packs/day: 0.1 packs/day for 10.0 years (1.0 ttl pk-yrs)     Types: Cigarettes     Start date: 1956     Quit date: 1966     Years since quittin.6    Smokeless tobacco: Never    Tobacco comments:     Light smoker - ~[id number]   Vaping Use    Vaping status: Never Used   Substance and Sexual Activity    Alcohol use: Not Currently     Comment: Recovering alcoholic    Drug use: Never       FAMILY HISTORY:  Reviewed  Family History   Problem Relation Age of Onset    Cancer Mother     Arthritis Mother     Arthritis Father     Lung cancer Sister     Cancer Son         , 2 years old    Diabetes Son     Diabetes Son         Type I       REVIEW OF SYSTEMS:  Gen- No fevers, chills  CV- No chest pain, palpitations  Resp- No cough, dyspnea  GI- No N/V/D, abd pain    Review of Systems      PHYSICAL EXAMINATION:   VITAL SIGNS: Temperature 98.1, pulse 107, blood pressure 131/83, respirations 18, O2 saturation 96%    Physical Exam  Constitutional:       General: She is not in acute distress.     Appearance: Normal appearance. She is not ill-appearing.   HENT:      Nose: No congestion or rhinorrhea.      Mouth/Throat:      Mouth: Mucous membranes are moist.      Pharynx: No oropharyngeal exudate.   Eyes:      Extraocular Movements: Extraocular movements intact.      Conjunctiva/sclera: Conjunctivae normal.      Pupils: Pupils are equal, round, and reactive to light.   Cardiovascular:      Rate and Rhythm: Normal rate and regular rhythm.   Pulmonary:      Effort: Pulmonary effort is normal. No respiratory distress.      Breath sounds: Normal breath sounds.   Abdominal:      General: Abdomen is flat. Bowel sounds are normal.      Palpations: Abdomen is soft.      Tenderness: There is no abdominal tenderness.   Musculoskeletal:         General: No swelling.      Cervical back: Neck supple.      Right lower leg: No edema.       Left lower leg: No edema.   Skin:     Coloration: Skin is not jaundiced.      Findings: No rash.   Neurological:      General: No focal deficit present.      Mental Status: She is alert and oriented to person, place, and time.      Cranial Nerves: No cranial nerve deficit.   Psychiatric:         Mood and Affect: Mood normal.         Behavior: Behavior normal.         Thought Content: Thought content normal.         RECORDS REVIEW:   Hospital records reviewed    ASSESSMENT     Diagnoses and all orders for this visit:    1. Multiple closed fractures of pelvis without disruption of pelvic ring, initial encounter (Primary)  2. Closed nondisplaced fracture of acetabulum, unspecified portion of acetabulum, unspecified laterality, initial encounter  -- PT/OT  --Heparin  -- Oxycodone, Tylenol    3. Acquired hypothyroidism  -- Levothyroxine    4. Essential hypertension  -- Verapamil, hydrochlorothiazide    5. Mild dementia without behavioral disturbance, psychotic disturbance, mood disturbance, or anxiety, unspecified dementia type  -- Memantine    6. Major depressive disorder, recurrent, moderate  -- Sertraline      PLAN    [x]  Discussed Patient in detail with nursing/staff, addressed all needs today.     [x]  Plan of Care Reviewed   [x]  PT/OT Reviewed   []  Order Changes  [x]  Discharge Plans Reviewed  []  Advance Directive on file with Nursing Home.   []  POA on file with Nursing Home.   [x]  Code Status listed: [x]  Full Code   []  DNR              Total face to face time spent with patient 35 minutes. Of which  35 minutes where in counseling the patient and family.     Bogdan Galarza DO  [facility]

## 2024-07-30 ENCOUNTER — NURSING HOME (OUTPATIENT)
Dept: INTERNAL MEDICINE | Facility: CLINIC | Age: 89
End: 2024-07-30
Payer: MEDICARE

## 2024-07-30 DIAGNOSIS — F03.A0 MILD DEMENTIA WITHOUT BEHAVIORAL DISTURBANCE, PSYCHOTIC DISTURBANCE, MOOD DISTURBANCE, OR ANXIETY, UNSPECIFIED DEMENTIA TYPE: ICD-10-CM

## 2024-07-30 DIAGNOSIS — E03.9 ACQUIRED HYPOTHYROIDISM: ICD-10-CM

## 2024-07-30 DIAGNOSIS — F33.1 MAJOR DEPRESSIVE DISORDER, RECURRENT, MODERATE: ICD-10-CM

## 2024-07-30 DIAGNOSIS — S32.82XA MULTIPLE CLOSED FRACTURES OF PELVIS WITHOUT DISRUPTION OF PELVIC RING, INITIAL ENCOUNTER: Primary | ICD-10-CM

## 2024-07-30 DIAGNOSIS — S32.409A CLOSED NONDISPLACED FRACTURE OF ACETABULUM, UNSPECIFIED PORTION OF ACETABULUM, UNSPECIFIED LATERALITY, INITIAL ENCOUNTER: ICD-10-CM

## 2024-07-30 DIAGNOSIS — I10 ESSENTIAL HYPERTENSION: ICD-10-CM

## 2024-07-30 PROCEDURE — 99305 1ST NF CARE MODERATE MDM 35: CPT | Performed by: INTERNAL MEDICINE

## 2024-07-30 NOTE — LETTER
Nursing Home History and Physical Note      Bogdan DO Geovanni  [x]  [address] [facility], [address]  [place] [address]  Phone: [phone]  Fax: [phone]     PATIENT NAME: Jaqui Matute                                                                            YOB: 1931              DATE OF SERVICE: 07/30/2024    FACILITY:   [x] [facility]   [] Other ______________     CHIEF COMPLAINT:   Status post recent ORIF of pelvic and acetabular fractures.      HISTORY OF PRESENT ILLNESS:   Patient is a 89-year-old female with past medical history of dementia, diverticulosis, hypertension, hyperlipidemia, hypothyroidism, prediabetes, breast cancer status post left mastectomy, depression, who presented to  with pelvic and acetabular fractures status post ORIF on 7/16 by Dr. Handy.  Then returns to the OR on 7/24 drain removal.  Was sent back to the [facility] for rehab when she started having increased bleeding at her incision sites.  Evaluated by orthopedics who did not feel there was any concern and has sent the patient back to rehab    PAST MEDICAL & SURGICAL HISTORY:   Past Medical History:   Diagnosis Date    Allergic 15MAR23    Seasonal allergies    Anemia     Arthritis 1995?    Osteoarthritis - hands    Cancer     Colon polyp     Dementia     Diverticulosis     GERD (gastroesophageal reflux disease)     Headache     Hyperlipidemia     Hypertension     Hypothyroidism     Prediabetes       Past Surgical History:   Procedure Laterality Date    BREAST SURGERY  2019    CHOLECYSTECTOMY      FEMUR FRACTURE SURGERY      HYSTERECTOMY      MASTECTOMY Left     REPLACEMENT TOTAL KNEE      THYROIDECTOMY      TOTAL HIP ARTHROPLASTY            MEDICATIONS:  I have reviewed and reconciled the patients medication list in the patients chart at the skilled nursing facility today.      ALLERGIES:  Reviewed  No Known Allergies      SOCIAL HISTORY:  Reviewed  Social History     Socioeconomic History    Marital  status:    Tobacco Use    Smoking status: Former     Current packs/day: 0.00     Average packs/day: 0.1 packs/day for 10.0 years (1.0 ttl pk-yrs)     Types: Cigarettes     Start date: 1956     Quit date: 1966     Years since quittin.6    Smokeless tobacco: Never    Tobacco comments:     Light smoker - ~[id number]   Vaping Use    Vaping status: Never Used   Substance and Sexual Activity    Alcohol use: Not Currently     Comment: Recovering alcoholic    Drug use: Never       FAMILY HISTORY:  Reviewed  Family History   Problem Relation Age of Onset    Cancer Mother     Arthritis Mother     Arthritis Father     Lung cancer Sister     Cancer Son         , 2 years old    Diabetes Son     Diabetes Son         Type I       REVIEW OF SYSTEMS:  Gen- No fevers, chills  CV- No chest pain, palpitations  Resp- No cough, dyspnea  GI- No N/V/D, abd pain    Review of Systems      PHYSICAL EXAMINATION:   VITAL SIGNS: Temperature 98.1, pulse 107, blood pressure 131/83, respirations 18, O2 saturation 96%    Physical Exam  Constitutional:       General: She is not in acute distress.     Appearance: Normal appearance. She is not ill-appearing.   HENT:      Nose: No congestion or rhinorrhea.      Mouth/Throat:      Mouth: Mucous membranes are moist.      Pharynx: No oropharyngeal exudate.   Eyes:      Extraocular Movements: Extraocular movements intact.      Conjunctiva/sclera: Conjunctivae normal.      Pupils: Pupils are equal, round, and reactive to light.   Cardiovascular:      Rate and Rhythm: Normal rate and regular rhythm.   Pulmonary:      Effort: Pulmonary effort is normal. No respiratory distress.      Breath sounds: Normal breath sounds.   Abdominal:      General: Abdomen is flat. Bowel sounds are normal.      Palpations: Abdomen is soft.      Tenderness: There is no abdominal tenderness.   Musculoskeletal:         General: No swelling.      Cervical back: Neck supple.      Right lower leg: No edema.       Left lower leg: No edema.   Skin:     Coloration: Skin is not jaundiced.      Findings: No rash.   Neurological:      General: No focal deficit present.      Mental Status: She is alert and oriented to person, place, and time.      Cranial Nerves: No cranial nerve deficit.   Psychiatric:         Mood and Affect: Mood normal.         Behavior: Behavior normal.         Thought Content: Thought content normal.         RECORDS REVIEW:   Hospital records reviewed    ASSESSMENT     Diagnoses and all orders for this visit:    1. Multiple closed fractures of pelvis without disruption of pelvic ring, initial encounter (Primary)  2. Closed nondisplaced fracture of acetabulum, unspecified portion of acetabulum, unspecified laterality, initial encounter  -- PT/OT  --Heparin  -- Oxycodone, Tylenol    3. Acquired hypothyroidism  -- Levothyroxine    4. Essential hypertension  -- Verapamil, hydrochlorothiazide    5. Mild dementia without behavioral disturbance, psychotic disturbance, mood disturbance, or anxiety, unspecified dementia type  -- Memantine    6. Major depressive disorder, recurrent, moderate  -- Sertraline      PLAN    [x]  Discussed Patient in detail with nursing/staff, addressed all needs today.     [x]  Plan of Care Reviewed   [x]  PT/OT Reviewed   []  Order Changes  [x]  Discharge Plans Reviewed  []  Advance Directive on file with Nursing Home.   []  POA on file with Nursing Home.   [x]  Code Status listed: [x]  Full Code   []  DNR              Total face to face time spent with patient 35 minutes. Of which  35 minutes where in counseling the patient and family.     Bogdan Galarza DO  [facility] [place] [facility]

## 2024-08-08 ENCOUNTER — HOSPITAL ENCOUNTER (EMERGENCY)
Facility: HOSPITAL | Age: 89
Discharge: OTHER INSTITUTION | End: 2024-08-09
Attending: EMERGENCY MEDICINE
Payer: MEDICARE

## 2024-08-08 ENCOUNTER — APPOINTMENT (OUTPATIENT)
Dept: CT IMAGING | Facility: HOSPITAL | Age: 89
End: 2024-08-08
Payer: MEDICARE

## 2024-08-08 DIAGNOSIS — E87.6 HYPOKALEMIA: ICD-10-CM

## 2024-08-08 DIAGNOSIS — S30.1XXA ABDOMINAL WALL SEROMA, INITIAL ENCOUNTER: Primary | ICD-10-CM

## 2024-08-08 PROCEDURE — 86140 C-REACTIVE PROTEIN: CPT | Performed by: EMERGENCY MEDICINE

## 2024-08-08 PROCEDURE — 87077 CULTURE AEROBIC IDENTIFY: CPT | Performed by: EMERGENCY MEDICINE

## 2024-08-08 PROCEDURE — 36415 COLL VENOUS BLD VENIPUNCTURE: CPT

## 2024-08-08 PROCEDURE — 99285 EMERGENCY DEPT VISIT HI MDM: CPT

## 2024-08-08 PROCEDURE — 85652 RBC SED RATE AUTOMATED: CPT | Performed by: EMERGENCY MEDICINE

## 2024-08-08 PROCEDURE — 85007 BL SMEAR W/DIFF WBC COUNT: CPT | Performed by: EMERGENCY MEDICINE

## 2024-08-08 PROCEDURE — 87205 SMEAR GRAM STAIN: CPT | Performed by: EMERGENCY MEDICINE

## 2024-08-08 PROCEDURE — 80053 COMPREHEN METABOLIC PANEL: CPT | Performed by: EMERGENCY MEDICINE

## 2024-08-08 PROCEDURE — 85025 COMPLETE CBC W/AUTO DIFF WBC: CPT | Performed by: EMERGENCY MEDICINE

## 2024-08-08 PROCEDURE — 87186 SC STD MICRODIL/AGAR DIL: CPT | Performed by: EMERGENCY MEDICINE

## 2024-08-08 PROCEDURE — 74176 CT ABD & PELVIS W/O CONTRAST: CPT

## 2024-08-08 PROCEDURE — 87070 CULTURE OTHR SPECIMN AEROBIC: CPT | Performed by: EMERGENCY MEDICINE

## 2024-08-09 VITALS
BODY MASS INDEX: 23.88 KG/M2 | HEIGHT: 65 IN | OXYGEN SATURATION: 96 % | DIASTOLIC BLOOD PRESSURE: 98 MMHG | RESPIRATION RATE: 18 BRPM | SYSTOLIC BLOOD PRESSURE: 151 MMHG | TEMPERATURE: 98.9 F | HEART RATE: 74 BPM | WEIGHT: 143.3 LBS

## 2024-08-09 LAB
ALBUMIN SERPL-MCNC: 3.1 G/DL (ref 3.5–5.2)
ALBUMIN/GLOB SERPL: 1 G/DL
ALP SERPL-CCNC: 168 U/L (ref 39–117)
ALT SERPL W P-5'-P-CCNC: 5 U/L (ref 1–33)
ANION GAP SERPL CALCULATED.3IONS-SCNC: 12 MMOL/L (ref 5–15)
AST SERPL-CCNC: 13 U/L (ref 1–32)
BASOPHILS # BLD MANUAL: 0.14 10*3/MM3 (ref 0–0.2)
BASOPHILS NFR BLD MANUAL: 1 % (ref 0–1.5)
BILIRUB SERPL-MCNC: 0.4 MG/DL (ref 0–1.2)
BUN SERPL-MCNC: 20 MG/DL (ref 8–23)
BUN/CREAT SERPL: 22.5 (ref 7–25)
CALCIUM SPEC-SCNC: 8.2 MG/DL (ref 8.2–9.6)
CHLORIDE SERPL-SCNC: 101 MMOL/L (ref 98–107)
CO2 SERPL-SCNC: 30 MMOL/L (ref 22–29)
CREAT SERPL-MCNC: 0.89 MG/DL (ref 0.57–1)
CRP SERPL-MCNC: 12.78 MG/DL (ref 0–0.5)
DEPRECATED RDW RBC AUTO: 62.6 FL (ref 37–54)
EGFRCR SERPLBLD CKD-EPI 2021: 60.5 ML/MIN/1.73
EOSINOPHIL # BLD MANUAL: 0.14 10*3/MM3 (ref 0–0.4)
EOSINOPHIL NFR BLD MANUAL: 1 % (ref 0.3–6.2)
ERYTHROCYTE [DISTWIDTH] IN BLOOD BY AUTOMATED COUNT: 17.3 % (ref 12.3–15.4)
ERYTHROCYTE [SEDIMENTATION RATE] IN BLOOD: 74 MM/HR (ref 0–30)
GLOBULIN UR ELPH-MCNC: 3.1 GM/DL
GLUCOSE SERPL-MCNC: 105 MG/DL (ref 65–99)
HCT VFR BLD AUTO: 29.6 % (ref 34–46.6)
HGB BLD-MCNC: 9.4 G/DL (ref 12–15.9)
LYMPHOCYTES # BLD MANUAL: 1.11 10*3/MM3 (ref 0.7–3.1)
LYMPHOCYTES NFR BLD MANUAL: 8 % (ref 5–12)
MCH RBC QN AUTO: 31.3 PG (ref 26.6–33)
MCHC RBC AUTO-ENTMCNC: 31.8 G/DL (ref 31.5–35.7)
MCV RBC AUTO: 98.7 FL (ref 79–97)
MONOCYTES # BLD: 1.11 10*3/MM3 (ref 0.1–0.9)
MYELOCYTES NFR BLD MANUAL: 1 % (ref 0–0)
NEUTROPHILS # BLD AUTO: 11.21 10*3/MM3 (ref 1.7–7)
NEUTROPHILS NFR BLD MANUAL: 75 % (ref 42.7–76)
NEUTS BAND NFR BLD MANUAL: 6 % (ref 0–5)
PLAT MORPH BLD: NORMAL
PLATELET # BLD AUTO: 335 10*3/MM3 (ref 140–450)
PMV BLD AUTO: 9.5 FL (ref 6–12)
POTASSIUM SERPL-SCNC: 2.9 MMOL/L (ref 3.5–5.2)
PROT SERPL-MCNC: 6.2 G/DL (ref 6–8.5)
RBC # BLD AUTO: 3 10*6/MM3 (ref 3.77–5.28)
RBC MORPH BLD: NORMAL
SODIUM SERPL-SCNC: 143 MMOL/L (ref 136–145)
VARIANT LYMPHS NFR BLD MANUAL: 8 % (ref 19.6–45.3)
WBC MORPH BLD: NORMAL
WBC NRBC COR # BLD AUTO: 13.84 10*3/MM3 (ref 3.4–10.8)

## 2024-08-09 NOTE — ED PROVIDER NOTES
Subjective   History of Present Illness  Patient presents for evaluation of a wound on her right lower abdomen that has been draining over the past few days.  From chart review it looks like patient had a acetabular fracture, had surgery with  orthopedics, has been seen once in the emergency department and once in clinic for draining wound that is thought to be due to a postoperative seroma.  She states she is not having any pain tonight.  No fevers or chills.    History provided by:  Patient      Review of Systems    Past Medical History:   Diagnosis Date    Allergic     Seasonal allergies    Anemia     Arthritis ?    Osteoarthritis - hands    Cancer     Colon polyp     Dementia     Diverticulosis     GERD (gastroesophageal reflux disease)     Headache     Hyperlipidemia     Hypertension     Hypothyroidism     Prediabetes        No Known Allergies    Past Surgical History:   Procedure Laterality Date    BREAST SURGERY      CHOLECYSTECTOMY      FEMUR FRACTURE SURGERY      HYSTERECTOMY      MASTECTOMY Left     REPLACEMENT TOTAL KNEE      THYROIDECTOMY      TOTAL HIP ARTHROPLASTY         Family History   Problem Relation Age of Onset    Cancer Mother     Arthritis Mother     Arthritis Father     Lung cancer Sister     Cancer Son         , 2 years old    Diabetes Son     Diabetes Son         Type I       Social History     Socioeconomic History    Marital status:    Tobacco Use    Smoking status: Former     Current packs/day: 0.00     Average packs/day: 0.1 packs/day for 10.0 years (1.0 ttl pk-yrs)     Types: Cigarettes     Start date: 1956     Quit date: 1966     Years since quittin.6    Smokeless tobacco: Never    Tobacco comments:     Light smoker - ~[id number]   Vaping Use    Vaping status: Never Used   Substance and Sexual Activity    Alcohol use: Not Currently     Comment: Recovering alcoholic    Drug use: Never           Objective   Physical Exam  Constitutional:        General: She is not in acute distress.  HENT:      Head: Normocephalic and atraumatic.   Eyes:      Conjunctiva/sclera: Conjunctivae normal.      Pupils: Pupils are equal, round, and reactive to light.   Cardiovascular:      Rate and Rhythm: Normal rate and regular rhythm.      Pulses: Normal pulses.      Heart sounds: No murmur heard.     No gallop.   Pulmonary:      Effort: Pulmonary effort is normal. No respiratory distress.   Abdominal:      General: Abdomen is flat. There is no distension.      Tenderness: There is no abdominal tenderness.   Musculoskeletal:         General: No swelling or deformity. Normal range of motion.   Skin:     Capillary Refill: Capillary refill takes less than 2 seconds.      Comments: On the right lower abdominal wall there is a surgical incision that is held together with Steri-Strips, no staples.  There is an approximately 1 inch area in the middle of the wound that is slightly dehisced that is draining serosanguineous/bloody fluid.   Neurological:      General: No focal deficit present.      Mental Status: She is alert and oriented to person, place, and time.   Psychiatric:         Mood and Affect: Mood normal.         Behavior: Behavior normal.         Procedures           ED Course                                             Medical Decision Making  Differential diagnosis includes postoperative seroma or abscess or hematoma.  Wound culture was obtained.  Lab and CT scan was conducted.    Patient transferred for continuity of care with her surgical team.  She remained hemodynamically stable in the emergency department.      Problems Addressed:  Abdominal wall seroma, initial encounter: complicated acute illness or injury    Amount and/or Complexity of Data Reviewed  Independent Historian: EMS     Details: Prehospital course by EMS  External Data Reviewed: notes.     Details: 7/31/2024 reviewed most recent note from orthopedics clinic documenting patient's recent surgical  history  Labs: ordered. Decision-making details documented in ED Course.  Radiology: ordered and independent interpretation performed. Decision-making details documented in ED Course.  Discussion of management or test interpretation with external provider(s): With on-call orthopedic physician Dr. Rausch at [facility] who recommends transfer to their facility for further evaluation and management of postoperative wound    Risk  Decision regarding hospitalization.        Final diagnoses:   Abdominal wall seroma, initial encounter   Hypokalemia       ED Disposition  ED Disposition       ED Disposition   Transfer to Another Facility     Condition   --    Comment   --             Recent Results (from the past 24 hour(s))   Comprehensive Metabolic Panel    Collection Time: 08/08/24 11:30 PM    Specimen: Blood   Result Value Ref Range    Glucose 105 (H) 65 - 99 mg/dL    BUN 20 8 - 23 mg/dL    Creatinine 0.89 0.57 - 1.00 mg/dL    Sodium 143 136 - 145 mmol/L    Potassium 2.9 (L) 3.5 - 5.2 mmol/L    Chloride 101 98 - 107 mmol/L    CO2 30.0 (H) 22.0 - 29.0 mmol/L    Calcium 8.2 8.2 - 9.6 mg/dL    Total Protein 6.2 6.0 - 8.5 g/dL    Albumin 3.1 (L) 3.5 - 5.2 g/dL    ALT (SGPT) 5 1 - 33 U/L    AST (SGOT) 13 1 - 32 U/L    Alkaline Phosphatase 168 (H) 39 - 117 U/L    Total Bilirubin 0.4 0.0 - 1.2 mg/dL    Globulin 3.1 gm/dL    A/G Ratio 1.0 g/dL    BUN/Creatinine Ratio 22.5 7.0 - 25.0    Anion Gap 12.0 5.0 - 15.0 mmol/L    eGFR 60.5 >60.0 mL/min/1.73     Note: In addition to lab results from this visit, the labs listed above may include labs taken at another facility or during a different encounter within the last 24 hours. Please correlate lab times with ED admission and discharge times for further clarification of the services performed during this visit.    CT Abdomen Pelvis Without Contrast   Final Result   Large collection of the lower abdomen extending along the right flank and possibly involving the  "right iliac is muscle. Postoperative changes. Small left pleural effusion.               Electronically Signed: Neymar Louie MD     8/8/2024 11:05 PM EDT     Workstation ID: [id number]        Vitals:    08/08/24 2328 08/08/24 2348 08/08/24 2350 08/09/24 0000   BP: (!) 145/116      Pulse:  67  79   Resp:       Temp:       SpO2:  95%  99%   Weight:   65 kg (143 lb 4.8 oz)    Height:   165.1 cm (65\")      Medications - No data to display  ECG/EMG Results (last 24 hours)       ** No results found for the last 24 hours. **          No orders to display           No follow-up provider specified.       Medication List      No changes were made to your prescriptions during this visit.            David Antoine MD  08/09/24 0017    "

## 2024-08-11 LAB
BACTERIA SPEC AEROBE CULT: ABNORMAL
GRAM STN SPEC: ABNORMAL

## 2024-08-15 ENCOUNTER — READMISSION MANAGEMENT (OUTPATIENT)
Dept: CALL CENTER | Facility: HOSPITAL | Age: 89
End: 2024-08-15
Payer: MEDICARE

## 2024-08-19 NOTE — PROGRESS NOTES
Nursing Home History and Physical Note      Bogdan Galarza DO  [x]  [address] [facility], [address]  [place] [address]  Phone: [phone]  Fax: [phone]     PATIENT NAME: Jaqui Matute                                                                            YOB: 1931              DATE OF SERVICE: 08/20/2024    FACILITY:   [x] [facility]   [] Other ______________     CHIEF COMPLAINT:   Fall, multiple fractures, weakness      HISTORY OF PRESENT ILLNESS:   Patient is a 89-year-old female with past medical history of osteoporosis, atrial fibrillation, dementia, ascending aortic aneurysm, history of breast cancer status post left mastectomy, who unfortunately suffered a fall on 7/11 with multiple fractures including right acetabular fracture, multiple pelvic fractures, T12 compression fracture, and underwent ORIF of right anterior columb and posterior hemitransverse acetabular fracture on 7/16.  She was discharged to the [facility] and return to the ER on 7/25 and again on 8/9 with wound dehiscence.  CT obtained showing large fluid collection extending into the right lower quadrant to the right flank region.  Underwent I&D of deep abscess to the right pelvic region.  Deep drain placed during this procedure.  Patient was evaluated by infectious disease.    Patient initially placed on multiple antibiotics.  Cultures grew Klebsiella and Enterococcus faecalis.  Plan is to continue Zosyn 8/12 through the present with end date anticipated of September 20.    Nursing staff reports inc edema in Les b/l and new wheezing.     PAST MEDICAL & SURGICAL HISTORY:   Past Medical History:   Diagnosis Date    Allergic 15MAR23    Seasonal allergies    Anemia     Arthritis 1995?    Osteoarthritis - hands    Cancer     Colon polyp     Dementia     Diverticulosis     GERD (gastroesophageal reflux disease)     Headache     Hyperlipidemia     Hypertension     Hypothyroidism     Prediabetes       Past Surgical  History:   Procedure Laterality Date    BREAST SURGERY  2019    CHOLECYSTECTOMY      FEMUR FRACTURE SURGERY      HYSTERECTOMY      MASTECTOMY Left     REPLACEMENT TOTAL KNEE      THYROIDECTOMY      TOTAL HIP ARTHROPLASTY            MEDICATIONS:  I have reviewed and reconciled the patients medication list in the patients chart at the skilled nursing facility today.      ALLERGIES:  Reviewed  No Known Allergies      SOCIAL HISTORY:  Reviewed  Social History     Socioeconomic History    Marital status:    Tobacco Use    Smoking status: Former     Current packs/day: 0.00     Average packs/day: 0.1 packs/day for 10.0 years (1.0 ttl pk-yrs)     Types: Cigarettes     Start date: 1956     Quit date: 1966     Years since quittin.6    Smokeless tobacco: Never    Tobacco comments:     Light smoker - ~[id number]   Vaping Use    Vaping status: Never Used   Substance and Sexual Activity    Alcohol use: Not Currently     Comment: Recovering alcoholic    Drug use: Never       FAMILY HISTORY:  Reviewed  Family History   Problem Relation Age of Onset    Cancer Mother     Arthritis Mother     Arthritis Father     Lung cancer Sister     Cancer Son         , 2 years old    Diabetes Son     Diabetes Son         Type I       REVIEW OF SYSTEMS:  Unable to obtain secondary to patient's dementia    Review of Systems      PHYSICAL EXAMINATION:   VITAL SIGNS: Temperature 98.2, pulse 95, respirations 16, blood pressure 124/74, O2 saturation 94%    Physical Exam  Constitutional:       General: She is not in acute distress.     Appearance: Normal appearance. She is not ill-appearing.   HENT:      Right Ear: Tympanic membrane normal.      Left Ear: Tympanic membrane normal.      Nose: No congestion or rhinorrhea.      Mouth/Throat:      Mouth: Mucous membranes are moist.      Pharynx: No oropharyngeal exudate.   Eyes:      Extraocular Movements: Extraocular movements intact.      Conjunctiva/sclera: Conjunctivae normal.       Pupils: Pupils are equal, round, and reactive to light.   Cardiovascular:      Rate and Rhythm: Normal rate and regular rhythm.   Pulmonary:      Effort: Pulmonary effort is normal. No respiratory distress.      Breath sounds: Normal breath sounds.   Abdominal:      General: Abdomen is flat. Bowel sounds are normal.      Palpations: Abdomen is soft.      Tenderness: There is no abdominal tenderness.   Musculoskeletal:         General: No swelling.      Cervical back: Neck supple.      Right lower leg: No edema.      Left lower leg: No edema.   Skin:     Coloration: Skin is not jaundiced.      Findings: No rash.   Neurological:      General: No focal deficit present.      Mental Status: She is alert. Mental status is at baseline. She is disoriented.      Cranial Nerves: No cranial nerve deficit.   Psychiatric:         Mood and Affect: Mood normal.         Behavior: Behavior normal.         Thought Content: Thought content normal.         RECORDS REVIEW:   Records reviewed    ASSESSMENT           Diagnoses and all orders for this visit:    1. Infection of prosthetic joint, subsequent encounter (Primary)  2. Abscess of female pelvis  --s/p incision and drainage with drain placement.    -- Continue Zosyn.If the patient is discharged from facility prior to 9/20, her Zosyn should be switched to 13.5 g IV every 24 hours as a continuous infusion.    3. Acquired hypothyroidism  --levothyroxine    4. Mild dementia without behavioral disturbance, psychotic disturbance, mood disturbance, or anxiety, unspecified dementia type  --memantine    5. Major depressive disorder, recurrent, moderate  --sertraline    6. Essential hypertension  --verapamil, hctz    7. Heartburn  -- omeprazole    8. Anxiety  Hydroxyzine, sertraline    9. New onset wheeze and LE edema  --? After IV fluids in hospital  --no known hx of CHF  --cxr pending, lasix 20mg x 4 days.     PLAN    [x]  Discussed Patient in detail with nursing/staff, addressed all  needs today.     [x]  Plan of Care Reviewed   [x]  PT/OT Reviewed   []  Order Changes  [x]  Discharge Plans Reviewed  []  Advance Directive on file with Nursing Home.   []  POA on file with Nursing Home.   [x]  Code Status listed: [x]  Full Code   []  DNR          Total face to face time spent with patient 35 minutes. Of which  35 minutes where in counseling the patient and family.     DO LAVERN Busch [facility]

## 2024-08-20 ENCOUNTER — NURSING HOME (OUTPATIENT)
Dept: INTERNAL MEDICINE | Facility: CLINIC | Age: 89
End: 2024-08-20
Payer: MEDICARE

## 2024-08-20 DIAGNOSIS — R12 HEARTBURN: ICD-10-CM

## 2024-08-20 DIAGNOSIS — T84.50XD INFECTION OF PROSTHETIC JOINT, SUBSEQUENT ENCOUNTER: Primary | ICD-10-CM

## 2024-08-20 DIAGNOSIS — I10 ESSENTIAL HYPERTENSION: ICD-10-CM

## 2024-08-20 DIAGNOSIS — E03.9 ACQUIRED HYPOTHYROIDISM: ICD-10-CM

## 2024-08-20 DIAGNOSIS — N73.9 ABSCESS OF FEMALE PELVIS: ICD-10-CM

## 2024-08-20 DIAGNOSIS — F33.1 MAJOR DEPRESSIVE DISORDER, RECURRENT, MODERATE: ICD-10-CM

## 2024-08-20 DIAGNOSIS — F03.A0 MILD DEMENTIA WITHOUT BEHAVIORAL DISTURBANCE, PSYCHOTIC DISTURBANCE, MOOD DISTURBANCE, OR ANXIETY, UNSPECIFIED DEMENTIA TYPE: ICD-10-CM

## 2024-08-20 DIAGNOSIS — F41.9 ANXIETY: ICD-10-CM

## 2024-08-20 PROCEDURE — 99305 1ST NF CARE MODERATE MDM 35: CPT | Performed by: INTERNAL MEDICINE

## 2024-08-20 NOTE — LETTER
Nursing Home History and Physical Note      Bogdan Galarza DO  [x]  [address] [facility] [address], [address]  [place] [address]  Phone: [phone]  Fax: [phone]     PATIENT NAME: Jaqui Matute                                                                            YOB: 1931              DATE OF SERVICE: 08/20/2024    FACILITY:   [x] [facility]   [] Other ______________     CHIEF COMPLAINT:   Fall, multiple fractures, weakness      HISTORY OF PRESENT ILLNESS:   Patient is a 89-year-old female with past medical history of osteoporosis, atrial fibrillation, dementia, ascending aortic aneurysm, history of breast cancer status post left mastectomy, who unfortunately suffered a fall on 7/11 with multiple fractures including right acetabular fracture, multiple pelvic fractures, T12 compression fracture, and underwent ORIF of right anterior columb and posterior hemitransverse acetabular fracture on 7/16.  She was discharged to the [facility] and return to the ER on 7/25 and again on 8/9 with wound dehiscence.  CT obtained showing large fluid collection extending into the right lower quadrant to the right flank region.  Underwent I&D of deep abscess to the right pelvic region.  Deep drain placed during this procedure.  Patient was evaluated by infectious disease.    Patient initially placed on multiple antibiotics.  Cultures grew Klebsiella and Enterococcus faecalis.  Plan is to continue Zosyn 8/12 through the present with end date anticipated of September 20.    Nursing staff reports inc edema in Les b/l and new wheezing.     PAST MEDICAL & SURGICAL HISTORY:   Past Medical History:   Diagnosis Date    Allergic 15MAR23    Seasonal allergies    Anemia     Arthritis 1995?    Osteoarthritis - hands    Cancer     Colon polyp     Dementia     Diverticulosis     GERD (gastroesophageal reflux disease)     Headache     Hyperlipidemia     Hypertension     Hypothyroidism     Prediabetes       Past Surgical  History:   Procedure Laterality Date    BREAST SURGERY  2019    CHOLECYSTECTOMY      FEMUR FRACTURE SURGERY      HYSTERECTOMY      MASTECTOMY Left     REPLACEMENT TOTAL KNEE      THYROIDECTOMY      TOTAL HIP ARTHROPLASTY            MEDICATIONS:  I have reviewed and reconciled the patients medication list in the patients chart at the skilled nursing facility today.      ALLERGIES:  Reviewed  No Known Allergies      SOCIAL HISTORY:  Reviewed  Social History     Socioeconomic History    Marital status:    Tobacco Use    Smoking status: Former     Current packs/day: 0.00     Average packs/day: 0.1 packs/day for 10.0 years (1.0 ttl pk-yrs)     Types: Cigarettes     Start date: 1956     Quit date: 1966     Years since quittin.6    Smokeless tobacco: Never    Tobacco comments:     Light smoker - ~[id number]   Vaping Use    Vaping status: Never Used   Substance and Sexual Activity    Alcohol use: Not Currently     Comment: Recovering alcoholic    Drug use: Never       FAMILY HISTORY:  Reviewed  Family History   Problem Relation Age of Onset    Cancer Mother     Arthritis Mother     Arthritis Father     Lung cancer Sister     Cancer Son         , 2 years old    Diabetes Son     Diabetes Son         Type I       REVIEW OF SYSTEMS:  Unable to obtain secondary to patient's dementia    Review of Systems      PHYSICAL EXAMINATION:   VITAL SIGNS: Temperature 98.2, pulse 95, respirations 16, blood pressure 124/74, O2 saturation 94%    Physical Exam  Constitutional:       General: She is not in acute distress.     Appearance: Normal appearance. She is not ill-appearing.   HENT:      Right Ear: Tympanic membrane normal.      Left Ear: Tympanic membrane normal.      Nose: No congestion or rhinorrhea.      Mouth/Throat:      Mouth: Mucous membranes are moist.      Pharynx: No oropharyngeal exudate.   Eyes:      Extraocular Movements: Extraocular movements intact.      Conjunctiva/sclera: Conjunctivae normal.       Pupils: Pupils are equal, round, and reactive to light.   Cardiovascular:      Rate and Rhythm: Normal rate and regular rhythm.   Pulmonary:      Effort: Pulmonary effort is normal. No respiratory distress.      Breath sounds: Normal breath sounds.   Abdominal:      General: Abdomen is flat. Bowel sounds are normal.      Palpations: Abdomen is soft.      Tenderness: There is no abdominal tenderness.   Musculoskeletal:         General: No swelling.      Cervical back: Neck supple.      Right lower leg: No edema.      Left lower leg: No edema.   Skin:     Coloration: Skin is not jaundiced.      Findings: No rash.   Neurological:      General: No focal deficit present.      Mental Status: She is alert. Mental status is at baseline. She is disoriented.      Cranial Nerves: No cranial nerve deficit.   Psychiatric:         Mood and Affect: Mood normal.         Behavior: Behavior normal.         Thought Content: Thought content normal.         RECORDS REVIEW:   Records reviewed    ASSESSMENT           Diagnoses and all orders for this visit:    1. Infection of prosthetic joint, subsequent encounter (Primary)  2. Abscess of female pelvis  --s/p incision and drainage with drain placement.    -- Continue Zosyn.If the patient is discharged from facility prior to 9/20, her Zosyn should be switched to 13.5 g IV every 24 hours as a continuous infusion.    3. Acquired hypothyroidism  --levothyroxine    4. Mild dementia without behavioral disturbance, psychotic disturbance, mood disturbance, or anxiety, unspecified dementia type  --memantine    5. Major depressive disorder, recurrent, moderate  --sertraline    6. Essential hypertension  --verapamil, hctz    7. Heartburn  -- omeprazole    8. Anxiety  Hydroxyzine, sertraline    9. New onset wheeze and LE edema  --? After IV fluids in hospital  --no known hx of CHF  --cxr pending, lasix 20mg x 4 days.     PLAN    [x]  Discussed Patient in detail with nursing/staff, addressed all  needs today.     [x]  Plan of Care Reviewed   [x]  PT/OT Reviewed   []  Order Changes  [x]  Discharge Plans Reviewed  []  Advance Directive on file with Nursing Home.   []  POA on file with Nursing Home.   [x]  Code Status listed: [x]  Full Code   []  DNR          Total face to face time spent with patient 35 minutes. Of which  35 minutes where in counseling the patient and family.     DO LAVERN Busch CRISTAL Ng

## 2024-09-15 PROBLEM — I71.9 AORTIC ANEURYSM: Status: ACTIVE | Noted: 2024-01-01

## 2024-09-15 PROBLEM — K44.9 HIATAL HERNIA: Status: ACTIVE | Noted: 2024-01-01

## 2024-09-15 PROBLEM — M81.0 OSTEOPOROSIS: Status: ACTIVE | Noted: 2020-09-18

## 2024-09-15 PROBLEM — I48.91 ATRIAL FIBRILLATION: Status: ACTIVE | Noted: 2020-09-18

## 2024-09-15 PROBLEM — I50.9 ACUTE CONGESTIVE HEART FAILURE: Status: ACTIVE | Noted: 2024-01-01

## 2024-09-15 PROBLEM — D64.9 ANEMIA: Status: ACTIVE | Noted: 2024-01-01

## 2024-09-15 PROBLEM — F03.90 DEMENTIA: Status: ACTIVE | Noted: 2020-09-18

## 2024-09-15 PROBLEM — S22.080A T12 COMPRESSION FRACTURE: Status: ACTIVE | Noted: 2024-01-01

## 2024-09-15 PROBLEM — J90 BILATERAL PLEURAL EFFUSION: Status: ACTIVE | Noted: 2024-01-01

## 2024-09-15 PROBLEM — I10 HTN (HYPERTENSION): Chronic | Status: ACTIVE | Noted: 2020-09-18

## 2024-09-15 PROBLEM — K57.90 DIVERTICULOSIS: Status: ACTIVE | Noted: 2024-01-01

## 2024-09-15 PROBLEM — I20.0 UNSTABLE ANGINA: Status: ACTIVE | Noted: 2024-01-01

## 2024-09-15 NOTE — H&P
[facility] Medicine Services  HISTORY AND PHYSICAL    Patient Name: Jaqui Matute  : 1931  MRN: [id number]  Primary Care Physician: Amrit Arredondo APRN  Date of admission: 9/15/2024    Subjective   Subjective     Chief Complaint:  Chest pain    HPI:  Jaqui Matute is a 89 y.o. female with a past medical history significant for pAF, Dementia, HTN, HLD, Hypothyroid, osteoporosis, GERD, DDD, diverticulosis, hiatal hernia, AAA, depression, breast cancer s/p left mastectomy and recent fall w/ polytrauma ( right acetabular fracture, multiple pelvic fractures and acute vs chronic compression fractures of T7/8, T12, L3in 2024. Was again admitted to [facility] in 2024 for abdominal wall seroma. Currently residing at [facility] where she is for rehab. Presents today with complaints of acute onset substernal chest pressure at rest plus progressively worsening SOB going on for the past week. HPI limited secondary to patient disposition. Son at bedside provides history. States the patient has been on supplemental oxygen at the rehab facility for the past 3 days. States he himself has noticed increased lower extremity edema. He suspects patient has a history heart failure as she has been on Lasix and Spironolactone in in the past. Now here for further evaluation and treatment.  There are no reports of fever, cough, congestion, or syncope. No focal weakness/ paresthesias. Will admit to observation.        Review of Systems   Unable to perform ROS: Dementia            Personal History     Past Medical History:   Diagnosis Date    Allergic     Seasonal allergies    Anemia     Arthritis ?    Osteoarthritis - hands    Breast cancer     Cancer     Colon polyp     Dementia     Diverticulosis     GERD (gastroesophageal reflux disease)     Headache     Hyperlipidemia     Hypertension     Hypothyroidism     Prediabetes              Past Surgical History:   Procedure Laterality  Date    BREAST SURGERY  2019    CHOLECYSTECTOMY      FEMUR FRACTURE SURGERY      HYSTERECTOMY      MASTECTOMY Left     REPLACEMENT TOTAL KNEE      THYROIDECTOMY      TOTAL HIP ARTHROPLASTY         Family History: family history includes Arthritis in her father and mother; Cancer in her mother and son; Diabetes in her son and son; Lung cancer in her sister.     Social History:  reports that she quit smoking about 58 years ago. Her smoking use included cigarettes. She started smoking about 68 years ago. She has a 1 pack-year smoking history. She has never used smokeless tobacco. She reports that she does not currently use alcohol. She reports that she does not use drugs.  Has been declining since acetabular fracture since July      Medications:  Melatonin, Multiple Vitamins-Minerals, cholecalciferol, levothyroxine, memantine, omeprazole, oxybutynin XL, sertraline, and verapamil SR    No Known Allergies    Objective   Objective     Vital Signs:   Temp:  [98.6 °F (37 °C)] 98.6 °F (37 °C)  Heart Rate:  [] 101  Resp:  [22] 22  BP: (146-164)/() 161/109  Flow (L/min):  [2] 2    Physical Exam   Constitutional: Awake, alert, chronically ill appearing  Eyes: PERRLA, sclerae anicteric, no conjunctival injection  HENT: NCAT, mucous membranes moist  Neck: Supple, no thyromegaly, no lymphadenopathy, trachea midline  Respiratory: Clear to auscultation bilaterally, nonlabored respirations   Cardiovascular: RRR, no murmurs, rubs, or gallops, palpable pedal pulses bilaterally  Gastrointestinal: Positive bowel sounds, soft, nontender, nondistended  Musculoskeletal: pitting, BLE edema +2 no clubbing or cyanosis to extremities  Psychiatric: Appropriate affect, cooperative  Neurologic: Oriented x 1, strength symmetric in all extremities, Cranial Nerves grossly intact to confrontation, speech clear  Skin: No rashes      Result Review:  I have personally reviewed the results from the time of this admission to 9/15/2024 19:56 EDT  and agree with these findings:  [x]  Laboratory list / accordion  [x]  Microbiology  [x]  Radiology  [x]  EKG/Telemetry   [x]  Cardiology/Vascular   []  Pathology  [x]  Old records  []  Other:  Most notable findings include: vitals stable. Trop 62, BNP 5163. Sodium 152. Alk phos 191. Hemoglobin 11.0. CXR looks like pulmonary edema.    LAB RESULTS:      Lab 09/15/24  1753   WBC 10.41   HEMOGLOBIN 11.0*   HEMATOCRIT 36.4   PLATELETS 218   NEUTROS ABS 8.00*   IMMATURE GRANS (ABS) 0.08*   LYMPHS ABS 0.64*   MONOS ABS 0.96*   EOS ABS 0.63*   .6*         Lab 09/15/24  1823   SODIUM 152*   POTASSIUM 4.5   CHLORIDE 108*   CO2 33.0*   ANION GAP 11.0   BUN 14   CREATININE 0.93   EGFR 57.4*   GLUCOSE 112*   CALCIUM 8.5         Lab 09/15/24  1823   TOTAL PROTEIN 6.3   ALBUMIN 3.7   GLOBULIN 2.6   ALT (SGPT) 62*   AST (SGOT) 32   BILIRUBIN 0.3   ALK PHOS 191*   LIPASE 11*         Lab 09/15/24  1823   PROBNP 5,163.0*   HSTROP T 62*                 Brief Urine Lab Results  (Last result in the past 365 days)        Color   Clarity   Blood   Leuk Est   Nitrite   Protein   CREAT   Urine HCG        07/11/24 0430 Dark Yellow   Clear     Trace                     Microbiology Results (last 10 days)       ** No results found for the last 240 hours. **            XR Chest 1 View    Result Date: 9/15/2024  XR CHEST 1 VW Date of Exam: 9/15/2024 5:45 PM EDT Indication: Chest Pain Triage Protocol Comparison: 7/10/2024 Findings: Right arm PICC terminates overlying the superior cavoatrial junction. Cardiac silhouette is enlarged. Pulmonary vasculature is indistinct. There are small bilateral pleural effusions and bibasilar atelectasis, edema, or infiltrates. No pneumothorax is seen. No acute osseous lesion is seen. There are senescent changes of the aorta and skeletal structures. Probable kyphoplasty changes within the upper thoracic spine. Surgical clips within the left axilla.     Impression: Impression: 1.Mild CHF with small bilateral  pleural effusions and bibasilar atelectasis, edema, or infiltrates. Electronically Signed: Parish Lamar MD  9/15/2024 6:10 PM EDT  Workstation ID: [id number]         Assessment & Plan   Assessment & Plan       Unstable angina    Anemia    Acute congestive heart failure    Atrial fibrillation    HTN (hypertension)    Acquired hypothyroidism    Anxiety    Aortic aneurysm    Dementia    GERD without esophagitis    Hyperlipidemia    89 yo female here with acute heart failure, chest pain    Unstable Angina  CHF- Bilateral pleural effusions  AAA  - trop 62, continue to trend. No acute EKG changes  - BNP 5163  - stable on 2L NC  - CTA chest pending  - administered 40 mg lasix in ED  - daily weights  - strict I&O  - obtain echocardiogram  - check TSH, magnesium  - consult to cardiology  - am labs  -- has had brisk response to diuretics    Anemia  - history of alcoholism  - check iron studies, b12, folate    Abdominal wall seroma   Multiple Fractures  - S/p fall w/ acetabular fracture/multiple pelvic fracture w/ open treatment right anterior column plus posterior cony transverse acetabular frac (7/16)   - S/P I&D deep abscess in right pelvic region 8/10; OR cultures w/ rare GPC in pairs  - Repeat I&D 8/13; cx growing 2+ Kleb variicola and E. Faecalis   - ID at [facility] following   - Continue IV Zosyn 3.375g q6hr as primary coverage of E. Faecalis and Klebsiella variicola; can switch to 12.5g IV q24 on transition to home for easier dosing - to complete total 6 week course (8/10-9/20); PICC line placed  - continue IV ABX and confirm dose with pharmacy in am, consider ID input  - PT/OT   - Follow up with Angelique Davis ID at [facility] 9/19 as scheduled     Persistent Atrial Fibrillation   HTN  HLD  - stable and rate controlled  - KEREN?DS?-VASc 4pts. No AC at baseline. Risk vs benefit given advanced age/dementia  - Continue home verapamil  - Monitor HR and adjust rate control as needed   - Monitor lytes and replace prn    Dementia  -  Oriented to self only; Pleasantly confused; Continue home memantine, melatonin + Delirium precautions     Anxiety/Depression  - continue home sertraline, hold prn hydroxyzine inpt to minimize risk of delirium    GERD   home omeprazole    OAB  -  continue home oxybutynin XL     Hypothyroidism  -  continue home levothyroxine     Hypernatremia  -POOR PO intake  -possibly related to abx and or cardiac cachexia    DVT prophylaxis:  IVETTE    CODE STATUS:  full code  Level Of Support Discussed With: Patient  Code Status (Patient has no pulse and is not breathing): CPR (Attempt to Resuscitate)  Medical Interventions (Patient has pulse or is breathing): Full Support  NEEDS PALLIATIVE TEAM    Expected Discharge  TBD      This note has been completed as part of a split-shared workflow.     Signature: Electronically signed by Yaneli Rojo PA-C, 09/15/24, 8:50 PM EDT  Patient seen and examined  She is frail, short of breath when sleeping, and hypoxic  CT reveals large bilateral effusions  -will attempt IR thoracentesis    Mandy Rich MD 09/15/24 23:13 EDT

## 2024-09-15 NOTE — ED PROVIDER NOTES
EMERGENCY DEPARTMENT ENCOUNTER    Pt Name: Jaqui Matute  MRN: [id number]  Pt :   1931  Room Number:    Date of encounter:  9/15/2024  PCP: Amrit Arredondo APRN  ED Provider: Jeffrey Robins MD    Historian: Paramedics, patient, patient's sons x 2      HPI:  Chief Complaint: Chest discomfort        Context: Jaqui Matute is a 89 y.o. female with a history of dementia, ascending aortic aneurysm, breast cancer, osteoporosis who presents to the ED c/o chest discomfort.  The patient's son was present with the patient when she reported that she had discomfort.  It seemed to be relatively short-lived, less than 20 or 30 minutes at least.  911 was called and paramedics arrived shortly thereafter.  Upon their arrival the patient denied chest discomfort and has not complained of it again since that time.  The patient has no recollection of even complaining of chest discomfort given her dementia.  Paramedics noted the patient was in atrial fibrillation but she does have a history of this.  No recent trauma or illness.    PAST MEDICAL HISTORY  Past Medical History:   Diagnosis Date    Allergic     Seasonal allergies    Anemia     Arthritis ?    Osteoarthritis - hands    Cancer     Colon polyp     Dementia     Diverticulosis     GERD (gastroesophageal reflux disease)     Headache     Hyperlipidemia     Hypertension     Hypothyroidism     Prediabetes          PAST SURGICAL HISTORY  Past Surgical History:   Procedure Laterality Date    BREAST SURGERY      CHOLECYSTECTOMY      FEMUR FRACTURE SURGERY      HYSTERECTOMY      MASTECTOMY Left     REPLACEMENT TOTAL KNEE      THYROIDECTOMY      TOTAL HIP ARTHROPLASTY           FAMILY HISTORY  Family History   Problem Relation Age of Onset    Cancer Mother     Arthritis Mother     Arthritis Father     Lung cancer Sister     Cancer Son         , 2 years old    Diabetes Son     Diabetes Son         Type I         SOCIAL HISTORY  Social History      Socioeconomic History    Marital status:    Tobacco Use    Smoking status: Former     Current packs/day: 0.00     Average packs/day: 0.1 packs/day for 10.0 years (1.0 ttl pk-yrs)     Types: Cigarettes     Start date: 1956     Quit date: 1966     Years since quittin.7    Smokeless tobacco: Never    Tobacco comments:     Light smoker - [phone]   Vaping Use    Vaping status: Never Used   Substance and Sexual Activity    Alcohol use: Not Currently     Comment: Recovering alcoholic    Drug use: Never         ALLERGIES  Patient has no known allergies.        REVIEW OF SYSTEMS  Review of Systems       All systems reviewed and negative except for those discussed in HPI.       PHYSICAL EXAM    I have reviewed the triage vital signs and nursing notes.    ED Triage Vitals [09/15/24 1742]   Temp Heart Rate Resp BP SpO2   -- 106 22 146/95 --      Temp src Heart Rate Source Patient Position BP Location FiO2 (%)   -- Monitor Lying Left arm --       Physical Exam  GENERAL:   Appears in no acute distress.  Pleasant 89-year-old appearing nontoxic.  I evaluate her immediately upon arrival and help move her to our bed from the paramedic kamilla.  HENT: Nares patent.  EYES: No scleral icterus.  CV: Regular rhythm, regular rate.  No murmurs gallops rubs slightly irregular clear to auscultation  RESPIRATORY: Normal effort.  No audible wheezes, rales or rhonchi.  ABDOMEN: Soft, nontender to deep palpation  MUSCULOSKELETAL: No deformities.  Some anterior chest wall tenderness to palpation  NEURO: Alert, moves all extremities, follows commands.  Very poor short-term memory  SKIN: Warm, dry, no rash visualized.  2-3+ bilaterally symmetric pitting edema bilateral lower extremities pretibial and pedal      LAB RESULTS  Recent Results (from the past 24 hour(s))   ECG 12 Lead ED Triage Standing Order; Chest Pain    Collection Time: 09/15/24  5:38 PM   Result Value Ref Range    QT Interval 366 ms    QTC Interval 477 ms    Green Top (Gel)    Collection Time: 09/15/24  5:53 PM   Result Value Ref Range    Extra Tube Hold for add-ons.    Lavender Top    Collection Time: 09/15/24  5:53 PM   Result Value Ref Range    Extra Tube hold for add-on    Gold Top - SST    Collection Time: 09/15/24  5:53 PM   Result Value Ref Range    Extra Tube Hold for add-ons.    Gray Top    Collection Time: 09/15/24  5:53 PM   Result Value Ref Range    Extra Tube Hold for add-ons.    Light Blue Top    Collection Time: 09/15/24  5:53 PM   Result Value Ref Range    Extra Tube Hold for add-ons.    CBC Auto Differential    Collection Time: 09/15/24  5:53 PM    Specimen: Blood   Result Value Ref Range    WBC 10.41 3.40 - 10.80 10*3/mm3    RBC 3.48 (L) 3.77 - 5.28 10*6/mm3    Hemoglobin 11.0 (L) 12.0 - 15.9 g/dL    Hematocrit 36.4 34.0 - 46.6 %    .6 (H) 79.0 - 97.0 fL    MCH 31.6 26.6 - 33.0 pg    MCHC 30.2 (L) 31.5 - 35.7 g/dL    RDW 18.3 (H) 12.3 - 15.4 %    RDW-SD 71.0 (H) 37.0 - 54.0 fl    MPV 10.9 6.0 - 12.0 fL    Platelets 218 140 - 450 10*3/mm3    Neutrophil % 76.8 (H) 42.7 - 76.0 %    Lymphocyte % 6.1 (L) 19.6 - 45.3 %    Monocyte % 9.2 5.0 - 12.0 %    Eosinophil % 6.1 0.3 - 6.2 %    Basophil % 1.0 0.0 - 1.5 %    Immature Grans % 0.8 (H) 0.0 - 0.5 %    Neutrophils, Absolute 8.00 (H) 1.70 - 7.00 10*3/mm3    Lymphocytes, Absolute 0.64 (L) 0.70 - 3.10 10*3/mm3    Monocytes, Absolute 0.96 (H) 0.10 - 0.90 10*3/mm3    Eosinophils, Absolute 0.63 (H) 0.00 - 0.40 10*3/mm3    Basophils, Absolute 0.10 0.00 - 0.20 10*3/mm3    Immature Grans, Absolute 0.08 (H) 0.00 - 0.05 10*3/mm3    nRBC 0.2 0.0 - 0.2 /100 WBC   High Sensitivity Troponin T    Collection Time: 09/15/24  6:23 PM    Specimen: Arm, Left; Blood   Result Value Ref Range    HS Troponin T 62 (C) <14 ng/L   Comprehensive Metabolic Panel    Collection Time: 09/15/24  6:23 PM    Specimen: Arm, Left; Blood   Result Value Ref Range    Glucose 112 (H) 65 - 99 mg/dL    BUN 14 8 - 23 mg/dL    Creatinine 0.93  0.57 - 1.00 mg/dL    Sodium 152 (H) 136 - 145 mmol/L    Potassium 4.5 3.5 - 5.2 mmol/L    Chloride 108 (H) 98 - 107 mmol/L    CO2 33.0 (H) 22.0 - 29.0 mmol/L    Calcium 8.5 8.2 - 9.6 mg/dL    Total Protein 6.3 6.0 - 8.5 g/dL    Albumin 3.7 3.5 - 5.2 g/dL    ALT (SGPT) 62 (H) 1 - 33 U/L    AST (SGOT) 32 1 - 32 U/L    Alkaline Phosphatase 191 (H) 39 - 117 U/L    Total Bilirubin 0.3 0.0 - 1.2 mg/dL    Globulin 2.6 gm/dL    A/G Ratio 1.4 g/dL    BUN/Creatinine Ratio 15.1 7.0 - 25.0    Anion Gap 11.0 5.0 - 15.0 mmol/L    eGFR 57.4 (L) >60.0 mL/min/1.73   Lipase    Collection Time: 09/15/24  6:23 PM    Specimen: Arm, Left; Blood   Result Value Ref Range    Lipase 11 (L) 13 - 60 U/L   BNP    Collection Time: 09/15/24  6:23 PM    Specimen: Arm, Left; Blood   Result Value Ref Range    proBNP 5,163.0 (H) 0.0 - 1,800.0 pg/mL   ECG 12 Lead ED Triage Standing Order; Chest Pain    Collection Time: 09/15/24  7:01 PM   Result Value Ref Range    QT Interval 364 ms    QTC Interval 483 ms       If labs were ordered, I independently reviewed the results and considered them in treating the patient.        RADIOLOGY  XR Chest 1 View    Result Date: 9/15/2024  XR CHEST 1 VW Date of Exam: 9/15/2024 5:45 PM EDT Indication: Chest Pain Triage Protocol Comparison: 7/10/2024 Findings: Right arm PICC terminates overlying the superior cavoatrial junction. Cardiac silhouette is enlarged. Pulmonary vasculature is indistinct. There are small bilateral pleural effusions and bibasilar atelectasis, edema, or infiltrates. No pneumothorax is seen. No acute osseous lesion is seen. There are senescent changes of the aorta and skeletal structures. Probable kyphoplasty changes within the upper thoracic spine. Surgical clips within the left axilla.     Impression: 1.Mild CHF with small bilateral pleural effusions and bibasilar atelectasis, edema, or infiltrates. Electronically Signed: Parish Lamar MD  9/15/2024 6:10 PM EDT  Workstation ID: [id number]     I  ordered and independently reviewed the above noted radiographic studies.      I viewed images of chest x-ray which showed vascular congestion and small pleural effusions consistent with acute CHF per my independent interpretation.    See radiologist's dictation for official interpretation.        PROCEDURES    Procedures    ECG 12 Lead ED Triage Standing Order; Chest Pain   Final Result   Test Reason : ED Triage Standing Order~   Blood Pressure :   */*   mmHG   Vent. Rate : 106 BPM     Atrial Rate : 159 BPM      P-R Int :   * ms          QRS Dur :  86 ms       QT Int : 364 ms       P-R-T Axes :   * -27  -3 degrees      QTc Int : 483 ms      Atrial fibrillation with rapid ventricular response   Abnormal ECG   When compared with ECG of 15-SEP-2024 17:38, (Unconfirmed)   No significant change was found   Confirmed by ZORAIDA SEE MD ([id number]) on 9/15/2024 7:19:42 PM      Referred By:            Confirmed By: ZORAIDA SEE MD      ECG 12 Lead ED Triage Standing Order; Chest Pain   Final Result   Test Reason : ED Triage Standing Order~   Blood Pressure :   */*   mmHG   Vent. Rate : 102 BPM     Atrial Rate : 108 BPM      P-R Int :   * ms          QRS Dur :  80 ms       QT Int : 366 ms       P-R-T Axes :   * -28 -14 degrees      QTc Int : 477 ms      Atrial fibrillation with rapid ventricular response   Low voltage QRS   Abnormal ECG   No previous ECGs available   Confirmed by ZORAIDA SEE MD ([id number]) on 9/15/2024 7:22:49 PM      Referred By: EDMD           Confirmed By: ZORAIDA SEE MD          MEDICATIONS GIVEN IN ER    Medications   sodium chloride 0.9 % flush 10 mL (has no administration in time range)   aspirin chewable tablet 324 mg (324 mg Oral Not Given 9/15/24 1812)   furosemide (LASIX) injection 40 mg (has no administration in time range)   hydrALAZINE (APRESOLINE) injection 20 mg (has no administration in time range)         MEDICAL DECISION MAKING, PROGRESS, and CONSULTS    All labs, if obtained, have been  independently reviewed by me.  All radiology studies, if obtained, have been reviewed by me and the radiologist dictating the report.  All EKG's, if obtained, have been independently viewed and interpreted by me/my attending physician.      Discussion below represents my analysis of pertinent findings related to patient's condition, differential diagnosis, treatment plan and final disposition.                         Differential diagnosis:    Chest discomfort with hypertension and CHF by chest x-ray as well as BNP.  Will diurese.  Of concern is high sodium as well.  Would consider worsening of the patient's aortic aneurysm.      Additional sources:    - Discussed/ obtained information from independent historians: I spoke with the patient's son at bedside.  I spoke with paramedics at bedside.    - External (non-ED) record review: Please see laboratories which I have reviewed and noted on in the ED course.        - Chronic or social conditions impacting care: Dementia    - Shared decision making: Patient/patient representative in complete agreement with current plans for evaluation and management.      Orders placed during this visit:  Orders Placed This Encounter   Procedures    XR Chest 1 View    CT Angiogram Chest    [place] Draw    High Sensitivity Troponin T    Comprehensive Metabolic Panel    Lipase    BNP    CBC Auto Differential    High Sensitivity Troponin T 2Hr    NPO Diet NPO Type: Strict NPO    Undress & Gown    Continuous Pulse Oximetry    Strict Intake & Output    Oxygen Therapy- Nasal Cannula; Titrate 1-6 LPM Per SpO2; 90 - 95%    ECG 12 Lead ED Triage Standing Order; Chest Pain    ECG 12 Lead ED Triage Standing Order; Chest Pain    Insert Peripheral IV    CBC & Differential    Green Top (Gel)    Lavender Top    Gold Top - SST    Gray Top    Light Blue Top         Additional orders considered but not ordered:  Echocardiogram    ED Course:    Consultants:      ED Course as of 09/15/24 1932   Sun Sep 15,  2024 1809 Given the patient's chest pain and history of ascending aortic aneurysm I have ordered a CTA of the chest [MS]   1809 I have rechecked the patient and spoken with her son at bedside.  She continues to be completely asymptomatic. [MS]   1914 proBNP(!): 5,163.0  1 year ago the BNP was only 363. [MS]   1914 HS Troponin T(!!): 62  No prior high-sensitivity troponins on record. [MS]   1914 Sodium(!): 152  1 month ago the patient sodium was 143. [MS]   1921 The patient has no listed Lasix and I find no report of Lasix being filled recently on the patient's outpatient records.  I have ordered a dose of Lasix 40 mg.  We will have to monitor laboratories including sodium closely.  [place] and admit to hospitalist. [MS]   1930 2 of the patient's sons have arrived in the emergency department.  I been in contact with both of them.  They tell me that the patient's blood pressures have been difficult to manage lately and that they have added hydralazine to the patient's medication list as of recently.  I have ordered a dose of 20 mg IV.  We are currently scanning the patient to ensure that her aortic aneurysm remains relatively unchanged.  She will require admission/observation.  I am contacting the hospitalist at this time. [MS]      ED Course User Index  [MS] Jeffrey Robins MD              Shared Decision Making:  After my consideration of clinical presentation and any laboratory/radiology studies obtained, I discussed the findings with the patient/patient representative who is in agreement with the treatment plan and the final disposition.   Risks and benefits of discharge and/or observation/admission were discussed.       AS OF 19:32 EDT VITALS:    BP - (!) 160/119  HR - 105  TEMP - 98.6 °F (37 °C) (Oral)  O2 SATS - 99%                  DIAGNOSIS  Final diagnoses:   Acute congestive heart failure, unspecified heart failure type   Acute chest pain   Dementia without behavioral disturbance, psychotic disturbance,  mood disturbance, or anxiety, unspecified dementia severity, unspecified dementia type         DISPOSITION  Admission      Please note that portions of this document were completed with voice recognition software.        Jeffrey Robins MD  09/15/24 5191

## 2024-09-15 NOTE — ED NOTES
Jaqui Matute    Nursing Report ED to Floor:  Mental status: DEMENTIA AT BASELINE ALERT TO SELF  Ambulatory status: NON-AMBULATORY IN ED  Oxygen Therapy:  2 LPM VIA N/C  Cardiac Rhythm: A-FIB  Admitted from: NURSING HOME FACILITY  Safety Concerns:  FALL RISK, DEMENTIA  Social Issues: N/A  ED Room #:  05    ED Nurse Phone Extension - [phone] or may call [phone].      HPI:   Chief Complaint   Patient presents with    Chest Pain       Past Medical History:  Past Medical History:   Diagnosis Date    Allergic 15MAR23    Seasonal allergies    Anemia     Arthritis 1995?    Osteoarthritis - hands    Breast cancer     Cancer     Colon polyp     Dementia     Diverticulosis     GERD (gastroesophageal reflux disease)     Headache     Hyperlipidemia     Hypertension     Hypothyroidism     Prediabetes         Past Surgical History:  Past Surgical History:   Procedure Laterality Date    BREAST SURGERY  2019    CHOLECYSTECTOMY      FEMUR FRACTURE SURGERY      HYSTERECTOMY      MASTECTOMY Left     REPLACEMENT TOTAL KNEE      THYROIDECTOMY      TOTAL HIP ARTHROPLASTY          Admitting Doctor:   Mandy Rich MD    Consulting Provider(s):  Consults       No orders found from 8/17/2024 to 9/16/2024.             Admitting Diagnosis:   The primary encounter diagnosis was Acute congestive heart failure, unspecified heart failure type. Diagnoses of Acute chest pain and Dementia without behavioral disturbance, psychotic disturbance, mood disturbance, or anxiety, unspecified dementia severity, unspecified dementia type were also pertinent to this visit.    Most Recent Vitals:   Vitals:    09/15/24 1925 09/15/24 1926 09/15/24 1928 09/15/24 1949   BP:  (!) 161/109     BP Location:       Patient Position:       Pulse: 100  108 101   Resp:       Temp:       TempSrc:       SpO2: 99%  98%    Weight:       Height:           Active LDAs/IV Access:   Lines, Drains & Airways       Active LDAs       Name Placement date Placement time Site Days     Peripheral IV 07/10/24 2302 Right Antecubital 07/10/24  2302  Antecubital  66    Peripheral IV 09/15/24 1753 Left Antecubital 09/15/24  1753  Antecubital  less than 1                    Labs (abnormal labs have a star):   Labs Reviewed   TROPONIN - Abnormal; Notable for the following components:       Result Value    HS Troponin T 62 (*)     All other components within normal limits    Narrative:     High Sensitive Troponin T Reference Range:  <14.0 ng/L- Negative Female for AMI  <22.0 ng/L- Negative Male for AMI  >=14 - Abnormal Female indicating possible myocardial injury.  >=22 - Abnormal Male indicating possible myocardial injury.   Clinicians would have to utilize clinical acumen, EKG, Troponin, and serial changes to determine if it is an Acute Myocardial Infarction or myocardial injury due to an underlying chronic condition.        COMPREHENSIVE METABOLIC PANEL - Abnormal; Notable for the following components:    Glucose 112 (*)     Sodium 152 (*)     Chloride 108 (*)     CO2 33.0 (*)     ALT (SGPT) 62 (*)     Alkaline Phosphatase 191 (*)     eGFR 57.4 (*)     All other components within normal limits    Narrative:     GFR Normal >60  Chronic Kidney Disease <60  Kidney Failure <15    The GFR formula is only valid for adults with stable renal function between ages 18 and 70.   LIPASE - Abnormal; Notable for the following components:    Lipase 11 (*)     All other components within normal limits   BNP (IN-HOUSE) - Abnormal; Notable for the following components:    proBNP 5,163.0 (*)     All other components within normal limits    Narrative:     This assay is used as an aid in the diagnosis of individuals suspected of having heart failure. It can be used as an aid in the diagnosis of acute decompensated heart failure (ADHF) in patients presenting with signs and symptoms of ADHF to the emergency department (ED). In addition, NT-proBNP of <300 pg/mL indicates ADHF is not likely.    Age Range Result Interpretation   NT-proBNP Concentration (pg/mL:      <50             Positive            >450                   Gray                 300-450                    Negative             <300    50-75           Positive            >900                  Gray                300-900                  Negative            <300      >75             Positive            >1800                  Gray                300-1800                  Negative            <300   CBC WITH AUTO DIFFERENTIAL - Abnormal; Notable for the following components:    RBC 3.48 (*)     Hemoglobin 11.0 (*)     .6 (*)     MCHC 30.2 (*)     RDW 18.3 (*)     RDW-SD 71.0 (*)     Neutrophil % 76.8 (*)     Lymphocyte % 6.1 (*)     Immature Grans % 0.8 (*)     Neutrophils, Absolute 8.00 (*)     Lymphocytes, Absolute 0.64 (*)     Monocytes, Absolute 0.96 (*)     Eosinophils, Absolute 0.63 (*)     Immature Grans, Absolute 0.08 (*)     All other components within normal limits   RAINBOW DRAW    Narrative:     The following orders were created for panel order [place] Draw.  Procedure                               Abnormality         Status                     ---------                               -----------         ------                     Green Top (Gel)[[id number]]                                  Final result               Lavender Top[[id number]]                                     Final result               Gold Top - SST[[id number]]                                   Final result               [place] Top[[id number]]                                         Final result               Light Blue Top[[id number]]                                   Final result                 Please view results for these tests on the individual orders.   HIGH SENSITIVITIY TROPONIN T 2HR   IRON PROFILE   FERRITIN   FOLATE   VITAMIN B12   TSH   MAGNESIUM   CBC AND DIFFERENTIAL    Narrative:     The following orders were created for panel order CBC & Differential.  Procedure                                Abnormality         Status                     ---------                               -----------         ------                     CBC Auto Differential[[id number]]        Abnormal            Final result               Scan Slide[[id number]]                                                                    Please view results for these tests on the individual orders.   GREEN TOP   LAVENDER TOP   GOLD TOP - SST   GRAY TOP   LIGHT BLUE TOP       Meds Given in ED:   Medications   sodium chloride 0.9 % flush 10 mL (has no administration in time range)   aspirin chewable tablet 324 mg (324 mg Oral Not Given 9/15/24 1812)   furosemide (LASIX) injection 40 mg (has no administration in time range)   nitroglycerin (NITROSTAT) ointment 0.5 inch (has no administration in time range)   metoprolol tartrate (LOPRESSOR) injection 2.5 mg (has no administration in time range)   iopamidol (ISOVUE-370) 76 % injection 100 mL (80 mL Intravenous Given 9/15/24 1940)           Last NIH score:                                                          Dysphagia screening results:        [place]ora Coma Scale:  No data recorded     CIWA:        Restraint Type:            Isolation Status:  No active isolations

## 2024-09-16 NOTE — PLAN OF CARE
Goal Outcome Evaluation:  Plan of Care Reviewed With: patient, family        Progress: no change  Outcome Evaluation: PT eval performed: Pt has had significant recent functional decline.  [facility] and had returned to ambulating with walker.  Pt very weak and SOA today.  Daughter requested we defer OOB mobility today, max-A x2 for rolling in bed.  very weakn, Unable to perfrom SLR.  Pending medical improvement, [facility]      Anticipated Discharge Disposition (PT): skilled nursing facility

## 2024-09-16 NOTE — PLAN OF CARE
Goal Outcome Evaluation:  Plan of Care Reviewed With: patient, son        Progress: no change  Outcome Evaluation: shortness of breath at rest and with any movement. Bllod pressure slightly elevated. Resting quietly in bed and noted to sleep at times. Son has remained at bedside. Has remained in Afib through the night with rate 100-113

## 2024-09-16 NOTE — PROGRESS NOTES
[facility]  PROGRESS NOTE    Patient Name: Jqaui Matute  : 1931  MRN: [id number]    Date of Admission: 9/15/2024  Primary Care Physician: Amrit Arredondo APRN    Subjective   Subjective     CC:  Chest pain    HPI:  Says she doesn't feel well in general, has difficulty further describing.       Objective   Objective     Vital Signs:   Temp:  [97.7 °F (36.5 °C)-98.6 °F (37 °C)] 97.8 °F (36.6 °C)  Heart Rate:  [] 116  Resp:  [18-22] 18  BP: (146-174)/() 174/110  Flow (L/min):  [2] 2     Physical Exam:  Frail, in bed  MM moist  Irregularly irregular  Breath sounds diminished, poor effort  Abd soft, NT  Incision with steri-strips along low abdomen  + LE edema, feet cool  Dusky discoloration of fingertips and of several toes  Awake, speech soft  Flat affect    Results Reviewed:  LAB RESULTS:      Lab 24  0803 09/15/24  1753   WBC 11.42* 10.41   HEMOGLOBIN 11.4* 11.0*   HEMATOCRIT 37.9 36.4   PLATELETS 203 218   NEUTROS ABS 8.39* 8.00*   IMMATURE GRANS (ABS) 0.10* 0.08*   LYMPHS ABS 0.75 0.64*   MONOS ABS 1.31* 0.96*   EOS ABS 0.75* 0.63*   .1* 104.6*   PROTIME 16.0*  --          Lab 24  0803 09/15/24  1823   SODIUM 148* 152*   POTASSIUM 4.7 4.5   CHLORIDE 105 108*   CO2 33.0* 33.0*   ANION GAP 10.0 11.0   BUN 13 14   CREATININE 0.97 0.93   EGFR 54.6* 57.4*   GLUCOSE 94 112*   CALCIUM 8.4 8.5   MAGNESIUM  --  1.6*   TSH  --  3.290         Lab 24  0803 09/15/24  1823   TOTAL PROTEIN 6.1 6.3   ALBUMIN 3.5 3.7   GLOBULIN 2.6 2.6   ALT (SGPT) 53* 62*   AST (SGOT) 27 32   BILIRUBIN 0.3 0.3   ALK PHOS 190* 191*   LIPASE  --  11*         Lab 24  0803 09/15/24  2142 09/15/24  1823   PROBNP  --   --  5,163.0*   HSTROP T  --  54* 62*   PROTIME 16.0*  --   --    INR 1.27*  --   --              Lab 09/15/24  2142 09/15/24  1823   IRON  --  21*   IRON SATURATION (TSAT)  --  6*   TIBC  --  332   TRANSFERRIN  --  223   FERRITIN  --  96.49    FOLATE 12.50  --    VITAMIN B 12 1,539*  --          Brief Urine Lab Results  (Last result in the past 365 days)        Color   Clarity   Blood   Leuk Est   Nitrite   Protein   CREAT   Urine HCG        07/11/24 0430 Dark Yellow   Clear     Trace                       Microbiology Results Abnormal       None            Adult Transthoracic Echo Complete w/ Color, Spectral and Contrast if necessary per protocol    Result Date: 9/16/2024    Left ventricular systolic function is mildly decreased. Calculated left ventricular EF = 42.8% Left ventricular ejection fraction appears to be 41 - 45%.   Left ventricular wall thickness is consistent with mild concentric hypertrophy.   The left atrial cavity is dilated.   Left atrial volume is severely increased.   There is calcification of the aortic valve.   The mitral valve peak gradient is 10 mmHg. The mitral valve mean gradient is 6 mmHg.   Moderate to severe tricuspid valve regurgitation is present.   Estimated right ventricular systolic pressure from tricuspid regurgitation is markedly elevated (>55 mmHg). Calculated right ventricular systolic pressure from tricuspid regurgitation is 65 mmHg.   The aortic root measures 2.7 cm. Mild dilation of the ascending aorta is present.   There is a left pleural effusion. There is a right pleural effusion.     CT Angiogram Chest    Result Date: 9/15/2024  CT ANGIOGRAM CHEST Date of Exam: 9/15/2024 7:34 PM EDT Indication: Chest pain with history of ascending aortic aneurysm. Comparison: CT abdomen pelvis 8/8/2024 Technique: CTA of the chest was performed after the uneventful intravenous administration of Isovue contrast. Reconstructed coronal and sagittal images were also obtained. In addition, a 3-D volume rendered image was created for interpretation. Automated  exposure control and iterative reconstruction methods were used. Findings: Study significantly limited by motion. Heart size appears mildly enlarged accentuated by low lung  volumes and displacement secondary to a moderate size hiatal hernia. No suspicious pericardial effusion. Evaluation of the aorta is slightly limited particularly in the descending aorta by phase of contrast enhancement. The ascending aorta demonstrates no evidence of dissection. The ascending aorta measures up to 3.8 cm. There is some tortuosity of the aortic arch. Aortic arch measures up to 3.6 cm in diameter. Atherosclerotic changes are noted of the aortic arch and origin of the great vessels. Some limitation secondary to streak artifact from contrast bolus in the left upper extremity noted. No obvious evidence of significant stenosis or occlusion of the great vessels. No evidence of dissection of the aortic arch. Evaluation of the descending aorta somewhat limited by the phase of contrast enhancement with no obvious acute abnormality appreciated. Atherosclerotic changes are noted. No aneurysmal dilation noted. The ascending aorta measures up to 2.8 cm in diameter. The pulmonary arterial system appears well opacified demonstrates no evidence of filling defect. Main pulmonary artery is normal in size measuring 3.3 cm. Evaluation of the hilar structures is limited by motion as well as consolidation and pleural effusions. No significant hilar or mediastinal adenopathy is appreciated. The esophagus appears to be grossly unremarkable. There are moderate to large bilateral pleural effusions which have increased in size from comparison CT abdomen/pelvis particularly on the right.. There is a moderate size hiatal hernia. Limited imaging of the upper abdomen demonstrates a peripherally calcified cyst within the spleen. Lung volumes are low. Central airways are grossly patent. Motion limited imaging of the lung parenchyma demonstrates dependent opacity compatible with atelectasis and/or edema. Multilevel degenerative change of the spine. Compression deformities of midthoracic vertebral bodies appear similar to remote  two-view chest from 2023. No definite acute osseous abnormality noted given limitations of the study     Impression: Impression: 1. Significantly motion compromised exam 2. Mild dilation of the ascending aorta measuring less than 4 cm. No evidence of dissection noted on limited imaging of the ascending aorta or aortic arch. Evaluation of the descending aorta is limited by phase of contrast enhancement. 3. Moderate to large bilateral pleural effusions and associated dependent consolidation likely atelectasis. Component of pulmonary edema also question. Pneumonia cannot be excluded. 4. Cardiomegaly accentuated by low lung volumes. 5. Other findings as above Electronically Signed: Fausto Beverly MD  9/15/2024 8:15 PM EDT  Workstation ID: [id number]    XR Chest 1 View    Result Date: 9/15/2024  XR CHEST 1 VW Date of Exam: 9/15/2024 5:45 PM EDT Indication: Chest Pain Triage Protocol Comparison: 7/10/2024 Findings: Right arm PICC terminates overlying the superior cavoatrial junction. Cardiac silhouette is enlarged. Pulmonary vasculature is indistinct. There are small bilateral pleural effusions and bibasilar atelectasis, edema, or infiltrates. No pneumothorax is seen. No acute osseous lesion is seen. There are senescent changes of the aorta and skeletal structures. Probable kyphoplasty changes within the upper thoracic spine. Surgical clips within the left axilla.     Impression: Impression: 1.Mild CHF with small bilateral pleural effusions and bibasilar atelectasis, edema, or infiltrates. Electronically Signed: Parish Lamar MD  9/15/2024 6:10 PM EDT  Workstation ID: [id number]     Results for orders placed during the hospital encounter of 09/15/24    Adult Transthoracic Echo Complete w/ Color, Spectral and Contrast if necessary per protocol    Interpretation Summary    Left ventricular systolic function is mildly decreased. Calculated left ventricular EF = 42.8% Left ventricular ejection fraction appears to be 41 -  45%.    Left ventricular wall thickness is consistent with mild concentric hypertrophy.    The left atrial cavity is dilated.    Left atrial volume is severely increased.    There is calcification of the aortic valve.    The mitral valve peak gradient is 10 mmHg. The mitral valve mean gradient is 6 mmHg.    Moderate to severe tricuspid valve regurgitation is present.    Estimated right ventricular systolic pressure from tricuspid regurgitation is markedly elevated (>55 mmHg). Calculated right ventricular systolic pressure from tricuspid regurgitation is 65 mmHg.    The aortic root measures 2.7 cm. Mild dilation of the ascending aorta is present.    There is a left pleural effusion. There is a right pleural effusion.      Current medications:  Scheduled Meds:aspirin, 324 mg, Oral, Once  fluconazole, 150 mg, Oral, Once  heparin (porcine), 5,000 Units, Subcutaneous, Q12H  levothyroxine, 125 mcg, Oral, Q AM  magnesium sulfate, 2 g, Intravenous, Once  memantine, 5 mg, Oral, BID  metoprolol succinate XL, 50 mg, Oral, Q24H  oxybutynin XL, 10 mg, Oral, Daily  pantoprazole, 40 mg, Oral, Daily  piperacillin-tazobactam (ZOSYN) 3.375 g IVPB in 100 mL NS MBP (CD), 3.375 g, Intravenous, Q6H  saccharomyces boulardii, 250 mg, Oral, BID  sertraline, 100 mg, Oral, Daily  sodium chloride, 10 mL, Intravenous, Q12H      Continuous Infusions:   PRN Meds:.  acetaminophen **OR** acetaminophen **OR** acetaminophen    senna-docusate sodium **AND** polyethylene glycol **AND** bisacodyl **AND** bisacodyl    melatonin    methocarbamol    nitroglycerin    ondansetron    sodium chloride    sodium chloride    sodium chloride    Assessment & Plan   Assessment & Plan     Active Hospital Problems    Diagnosis  POA    **Unstable angina [I20.0]  Yes    Anemia [D64.9]  Yes    Acute congestive heart failure [I50.9]  Yes    Bilateral pleural effusion [J90]  Yes    Aortic aneurysm [I71.9]  Yes    Acquired hypothyroidism [E03.9]  Yes    Atrial fibrillation  [I48.91]  Yes    Dementia [F03.90]  Yes    HTN (hypertension) [I10]  Yes    Anxiety [F41.9]  Yes    Hyperlipidemia [E78.5]  Yes    GERD without esophagitis [K21.9]  Yes      Resolved Hospital Problems   No resolved problems to display.        Brief Hospital Course to date:  Jaqui Matute is a 89 y.o. female with h/o PAF, Dementia, HTN, HLD, Hypothyroid, OA, GERD, DDD, HH, AAA, depression, breast cancer s/p left mastectomy, and recent fall with polytrauma including right acetabular fracture, multiple pelvic fractures and acute versus chronic compression fractures of the vertebrae in July 2024 with readmission to  in August 2024 for abdominal wall seroma who presents with progressively worsening shortness of breath    Chest pain  Acute on chronic heart failure with reduced ejection fraction  - EF 41-45% with mild concentric hypertrophy  - dusky discoloration of fingertips, cool feet  - duplex LE and angio with runoff pending.  - check stat lactate  - discussed evaluation and treatment plan with Cardiology Dr Sellers    Atrial fibrillation  - not currently on anticoagulation, presumably due to fall risk  - metoprolol for rate control    Pleural effusions  - bilateral  - plan for thoracentesis when patient more clinically stable  - currently on 2L NC    HTN    HLD    Abdominal wall seroma  Pelvic surgical site infection with hardware  - recently followed at  by ID, cultures grew E faecalis and K variicoa (I&D 8/10/24)   - zosyn through 9/20 with planned transition to oral antibiotics (lifelong) for suppression  - PICC care    Recent fall with multiple fractures  -PT/OT    Hypernatremia  - sodium 152-->148  - suspect poor po intake  - Nutrition consult    Dementia    Anxiety and depression    GERD    OAB    Hypothyroid    Code status.  Daughter at bedside states she is patient POA and states patient is DNR, DNI.  EPIC updated    Goals of care: prognosis remains guarded, discussed with family at bedside.    Expected  Discharge Location and Transportation:   Expected Discharge   Expected Discharge Date: 9/18/2024; Expected Discharge Time:      VTE Prophylaxis:  Pharmacologic VTE prophylaxis orders are present.         AM-PAC 6 Clicks Score (PT): 11 (09/15/24 2106)    CODE STATUS:   Code Status and Medical Interventions: CPR (Attempt to Resuscitate); Full Support   Ordered at: 09/15/24 1956     Level Of Support Discussed With:    Patient     Code Status (Patient has no pulse and is not breathing):    CPR (Attempt to Resuscitate)     Medical Interventions (Patient has pulse or is breathing):    Full Support       Dragan Trinidad MD  09/16/24

## 2024-09-16 NOTE — CONSULTS
Jaqui Matute  [id number]  6/12/1931   LOS: 1 day   Patient Care Team:  Amrit Arredondo APRN as PCP - General (Family Medicine)    Ms. 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    Chief Complaint: PAF    Problem List:  Paroxysmal atrial fibrillation  CHADsVasc 4, brief episodes of atrial fibrillation after she has had orthopedic surgeries the past couple months-data deficit  No anticoagulation at baseline; risk versus benefit given advanced age/dementia/frequent falls  Chest pain  Dyspnea on exertion, large bilateral effusions, plans for US thoracentesis 9/16/2024  Hypertension  Hyperlipidemia  Prediabetes  Hypothyroidism  GERD  DDD  Diverticulosis  Hiatal hernia  Dementia  Ascending aortic aneurysm, less than 4 cm on CTA chest September 2024  Anemia  Depression  Hypernatremia  History of breast cancer, status post left mastectomy, no radiation or chemo  Former tobacco use with 1-pack-year smoking history, quit about 58 years ago  Recent fall w/ polytrauma (right acetabular fracture, multiple pelvic fractures and acute vs chronic compression fractures of T7/8, T12, L3 in July 2024. Was again admitted to [facility] in August 2024 for abdominal wall seroma   Surgical history:  Left mastectomy  Cholecystectomy  Femur fracture surgery  Hysterectomy  Left total knee replacement  Thyroidectomy  Hip replacement    No Known Allergies  Medications Prior to Admission   Medication Sig Dispense Refill Last Dose    acetaminophen (TYLENOL) 325 MG tablet Take 2 tablets by mouth Every 6 (Six) Hours As Needed for Mild Pain, Headache or Fever.   Past Week    calcium carbonate (TUMS) 500 MG chewable tablet Chew 2 tablets Daily. With a meal   9/15/2024    Cholecalciferol (Vitamin D3) 50 MCG (2000 UT) tablet Take 1 tablet by mouth Daily.   9/15/2024    fluconazole (DIFLUCAN) 150 MG tablet Take 1 tablet by mouth. For 3 doses, take 1 dose every 72 hrs.  Last dose 09-13 last dose 09-16-24   Past Week    furosemide (LASIX) 20 MG tablet Take 1 tablet by mouth Daily.   9/15/2024    hydrALAZINE (APRESOLINE) 10 MG tablet Take 1 tablet by mouth 2 (Two) Times a Day As Needed (High Blood pressure). SBP>170 and DBP>90   9/14/2024    ipratropium-albuterol (DUO-NEB) 0.5-2.5 mg/3 ml nebulizer Take 3 mL by nebulization Every 6 (Six) Hours As Needed for Wheezing or Shortness of Air.   Past Week    levothyroxine (SYNTHROID, LEVOTHROID) 125 MCG tablet Take 1 tablet by mouth once daily (Patient taking differently: Take 1 tablet by mouth Every Morning.) 90 tablet 0 9/15/2024    melatonin 5 MG tablet tablet Take 1 tablet by mouth every night at bedtime.   9/14/2024    memantine (NAMENDA) 5 MG tablet Take 1 tablet by mouth twice daily 180 tablet 0 9/15/2024    methocarbamol (ROBAXIN) 750 MG tablet Take 1 tablet by mouth Every 6 (Six) Hours As Needed for Muscle Spasms.   Past Week    Multiple Vitamins-Minerals (MULTIVITAMIN ADULT EXTRA C PO) Take 1 tablet by mouth Daily. Hair-Skin- Nails   9/15/2024    nystatin (MYCOSTATIN) 100,000 unit/mL suspension Swish and swallow 5 mL 3 times a day. For 10 days, started on 09-10-24   9/15/2024    omeprazole (priLOSEC) 20 MG capsule Take 1 capsule by mouth Daily. 90 capsule 4 9/15/2024    oxybutynin XL (DITROPAN-XL) 10 MG 24 hr tablet Take 1 tablet by mouth Daily. 90 tablet 4 9/15/2024    polyethylene glycol (MIRALAX) 17 g packet Take 17 g by mouth Daily.   9/14/2024    potassium chloride (KLOR-CON M10) 10 MEQ CR tablet Take 4 tablets by mouth Daily.   9/15/2024    saccharomyces boulardii (FLORASTOR) 250 MG capsule Take 1 capsule by mouth 2 (Two) Times a Day.   9/15/2024    sertraline (ZOLOFT) 100 MG tablet Take 1 tablet by mouth Daily. (Patient taking differently: Take 1 tablet by mouth every night at bedtime.) 90 tablet 4 9/14/2024    sodium chloride 0.9 % solution 100 mL with piperacillin-tazobactam 3.375 (3-0.375) g reconstituted solution 3.375  g IVPB Infuse 3.375 g into a venous catheter Every 6 (Six) Hours. Run time 1 hour   9/15/2024    verapamil SR (CALAN-SR) 240 MG CR tablet Take 1 tablet by mouth Daily. (Patient taking differently: Take 1 tablet by mouth Every Night.) 90 tablet 4 9/14/2024     Scheduled Meds:aspirin, 324 mg, Oral, Once  fluconazole, 150 mg, Oral, Once  heparin (porcine), 5,000 Units, Subcutaneous, Q12H  levothyroxine, 125 mcg, Oral, Q AM  memantine, 5 mg, Oral, BID  oxybutynin XL, 10 mg, Oral, Daily  pantoprazole, 40 mg, Oral, Daily  piperacillin-tazobactam (ZOSYN) 3.375 g IVPB in 100 mL NS MBP (CD), 3.375 g, Intravenous, Q6H  saccharomyces boulardii, 250 mg, Oral, BID  sertraline, 100 mg, Oral, Daily  sodium chloride, 10 mL, Intravenous, Q12H  verapamil SR, 240 mg, Oral, Daily      Continuous Infusions:   PRN Meds:.  acetaminophen **OR** acetaminophen **OR** acetaminophen    senna-docusate sodium **AND** polyethylene glycol **AND** bisacodyl **AND** bisacodyl    melatonin    methocarbamol    nitroglycerin    ondansetron    sodium chloride    sodium chloride    sodium chloride       History of Present Illness:   This is a 89-year-old white female who had a right acetabular fracture July 2024 and has been in rehab with decline since the hip fracture.  She was admitted at [facility] August 2024 for abdominal wall seroma.  While at the [facility] in [place] the patient had acute onset substernal chest pressure at rest, worsening shortness of breath, increasing lower extremity edema over the past week but no fever/chills, cough, presyncope, syncope, focal weakness.  Patient presented to [facility] 9/15/2024.  On admission hemoglobin was 11, sodium 152, proBNP 5163, WBC 11.42, high-sensitivity troponin 62, 54, lipase 11, ALT 62, chest x-ray showing mild CHF with small bilateral pleural effusions.  She was given 40 mg IV Lasix in the ED.  CTA chest showed moderate to large bilateral pleural effusions with plan for upcoming US thoracentesis today.   Patient in atrial fibrillation with RVR.  She was given 2.5 mg IV Lopressor 9/15/2024.  The patient has dementia so no review of systems is able to be obtained from the patient but the patient's daughter is currently in room and is a nurse.  She reports that the patient has never had any cardiac testing other than an echocardiogram remotely a couple years ago when she was incidentally found to have an ascending aortic aneurysm.  She saw cardiologist (unsure who MD was) with no plans for any further testing due to the patient's age.  The patient has had atrial fibrillation in the past after she had orthopedic surgeries but it was short-lived and the patient never required cardioversion.  She was not on anticoagulants due to frequent falls.  When the patient started developing lower extremity edema she uses compression stockings which helps some.  She had received Lasix about 4 times last week.  She has never had any CVAs, TIAs, seizures, DVTs, PEs, rheumatic fever, stress test, heart catheterizations, Holter monitors in the past.  The patient smoked briefly when she was in college.  Currently having echocardiogram in room.    Cardiac risk factors: advanced age (older than 55 for men, 65 for women), dyslipidemia, hypertension, and sedentary lifestyle.    Social History     Socioeconomic History    Marital status:    Tobacco Use    Smoking status: Former     Current packs/day: 0.00     Average packs/day: 0.1 packs/day for 10.0 years (1.0 ttl pk-yrs)     Types: Cigarettes     Start date: 1956     Quit date: 1966     Years since quittin.7    Smokeless tobacco: Never    Tobacco comments:     Light smoker - ~[phone]   Vaping Use    Vaping status: Never Used   Substance and Sexual Activity    Alcohol use: Not Currently     Comment: Recovering alcoholic    Drug use: Never     Family History   Problem Relation Age of Onset    Cancer Mother     Arthritis Mother     Arthritis Father     Lung cancer Sister   "   Cancer Son         , 2 years old    Diabetes Son     Diabetes Son         Type I       Review of Systems  10 point review of systems was completed, positives outlined in the HPI, and otherwise all other systems are negative.      Objective:       Physical Exam  BP (!) 174/110 (BP Location: Right arm, Patient Position: Lying)   Pulse 116   Temp 97.8 °F (36.6 °C) (Oral)   Resp 18   Ht 165.1 cm (65\")   Wt 71 kg (156 lb 9.6 oz)   SpO2 100%   BMI 26.06 kg/m²       09/15/24  1742 24  0627   Weight: 62.6 kg (138 lb) 71 kg (156 lb 9.6 oz)     Body mass index is 26.06 kg/m².    Intake/Output Summary (Last 24 hours) at 2024 0839  Last data filed at 9/15/2024 2330  Gross per 24 hour   Intake --   Output 700 ml   Net -700 ml       General Appearance:  No distress, appears stated age, ill-appearing   Head:  Normocephalic, without obvious abnormality, atraumatic   Neck: Supple, symmetrical, trachea midline, no adenopathy, thyroid: not enlarged, symmetric, no tenderness/mass/nodules, no carotid bruit or JVD   Lungs:   Diffuse rales to auscultation bilaterally, respirations unlabored, 2 L O2 NC   Heart:  Atrial fibrillation with RVR , S1, S2 normal, grade 1/6 murmur, rub or gallop   Abdomen:   Soft, nontender, no masses, no organomegaly, bowel sounds audible x4   Extremities: 2+ edema, normal range of motion   Pulses: Difficult to palpate with edema   Skin: Right foot cooler than left   Neurologic: Dementia       Cardiographics  EKG 9/15/2024:  Atrial fibrillation with rapid ventricular response  Abnormal ECG  When compared with ECG of 15-SEP-2024 17:38, (Unconfirmed)  No significant change was found  Confirmed by MAYI MOSELEY, ZORAIDA ([id number]) on 9/15/2024 7:19:42 PM    Imaging  Chest x-ray 9/15/2024:  1.Mild CHF with small bilateral pleural effusions and bibasilar atelectasis, edema, or infiltrates.     CTA chest 9/15/2024:  1. Significantly motion compromised exam   2. Mild dilation of the ascending " aorta measuring less than 4 cm. No evidence of dissection noted on limited imaging of the ascending aorta or aortic arch. Evaluation of the descending aorta is limited by phase of contrast enhancement.   3. Moderate to large bilateral pleural effusions and associated dependent consolidation likely atelectasis. Component of pulmonary edema also question. Pneumonia cannot be excluded.   4. Cardiomegaly accentuated by low lung volumes.   5. Other findings as above    Lab Review   Results from last 7 days   Lab Units 09/16/24  0803 09/15/24  1823   SODIUM mmol/L 148* 152*   POTASSIUM mmol/L 4.7 4.5   CHLORIDE mmol/L 105 108*   CO2 mmol/L 33.0* 33.0*   BUN mg/dL 13 14   CREATININE mg/dL 0.97 0.93   GLUCOSE mg/dL 94 112*   CALCIUM mg/dL 8.4 8.5     Results from last 7 days   Lab Units 09/16/24  0803 09/15/24  1753   WBC 10*3/mm3 11.42* 10.41   HEMOGLOBIN g/dL 11.4* 11.0*   HEMATOCRIT % 37.9 36.4   PLATELETS 10*3/mm3 203 218             Results from last 7 days   Lab Units 09/15/24  2142 09/15/24  1823   HSTROP T ng/L 54* 62*   Magnesium 1.6 on 9/16/2024  proBNP 5163   Assessment:   Patient with atrial fibrillation with RVR, heart failure exacerbation, and moderate to large bilateral pleural effusions with upcoming thoracentesis today.  Will review echocardiogram results when available.  Patient has had decline since right acetabular fracture, multiple pelvic fractures and acute vs chronic compression fractures of T7/8, T12, L3 in July 2024.  Patient's right foot cooler than left so we will order CTA with runoff.      Plan:   MgSO4 2 g x once  Lasix 40 mg IV every 12 hours.  Thoracentesis now on hold.  Start Toprol 50 mg daily  Continue as needed IV Lopressor 2.5mg for sustained rates >120bpm, hold for systolic blood pressure less than 100 mmHg  Defer MPS/LHC/ECV currently  Bilateral lower extremity venous duplex  CTA with runoff    Electronically signed by MAGI Cassidy, 09/16/24, 9:53 AM EDT.       Patient seen  and examined a face-to-face encounter.  She denies chest pain currently.  Does have a history of dementia.  Appears chronically ill.    Her daughter is at bedside and helps with history.    Patient is in A-fib.  This was noted during recent  hospital admissions per her daughter but did not sustain.  Currently patient is volume overloaded and EF has been shown to be 41 to 45% on echocardiogram      Patient has diminished breath sounds bilaterally and is tachycardic.  Right foot is cooler than the left but she has cyanotic digits on all 4 extremities.    Discussed with Dr. Trinidad.  Too ill to go for thoracentesis today.    We are checking a stat lactate.  A CTA with runoff is pending to evaluate her lower extremities      Resume IV diuresis Lasix 40 mg IV every 12 hours    Will give 1 dose of IV digoxin now for acute rate control.      Guarded prognosis.      I, Lukas Sellers M.D.,  have reviewed the notes, assessments, and/or procedures performed by MAGI/PA, I CONCUR with the documentation of Jaqui Matute.

## 2024-09-16 NOTE — PLAN OF CARE
Goal Outcome Evaluation:  Plan of Care Reviewed With: patient, daughter, son           Outcome Evaluation: OT eval completed. Pt presents from rehabilitation setting with significant decline in function, increased weakness and SOA. Pt's daughter present during eval, requested OT defer transition to sit EOB, repositioned in bed with MaxAx2, Dep for washing face and hands. IP OT services warranted. Recommend return to rehab at discharge when medically appropriate.      Anticipated Discharge Disposition (OT): inpatient rehabilitation facility

## 2024-09-16 NOTE — THERAPY EVALUATION
Patient Name: Jaqui Matute  : 1931    MRN: [id number]                              Today's Date: 2024       Admit Date: 9/15/2024    Visit Dx:     ICD-10-CM ICD-9-CM   1. Acute congestive heart failure, unspecified heart failure type  I50.9 428.0   2. Acute chest pain  R07.9 786.50   3. Dementia without behavioral disturbance, psychotic disturbance, mood disturbance, or anxiety, unspecified dementia severity, unspecified dementia type  F03.90 294.20     Patient Active Problem List   Diagnosis    Acquired hypothyroidism    Anxiety    Aortic aneurysm    Atrial fibrillation    Dementia    Diverticulosis    Hiatal hernia    GERD without esophagitis    HTN (hypertension)    Hyperlipidemia    Osteoporosis    T12 compression fracture    Unstable angina    Anemia    Acute congestive heart failure    Bilateral pleural effusion     Past Medical History:   Diagnosis Date    Allergic     Seasonal allergies    Anemia     Arthritis ?    Osteoarthritis - hands    Breast cancer     Cancer     Colon polyp     Dementia     Diverticulosis     GERD (gastroesophageal reflux disease)     Headache     Hyperlipidemia     Hypertension     Hypothyroidism     Prediabetes      Past Surgical History:   Procedure Laterality Date    BREAST SURGERY      CHOLECYSTECTOMY      FEMUR FRACTURE SURGERY      HYSTERECTOMY      MASTECTOMY Left     REPLACEMENT TOTAL KNEE      THYROIDECTOMY      TOTAL HIP ARTHROPLASTY        General Information       Row Name 24 1330          Physical Therapy Time and Intention    Document Type evaluation  -KG     Mode of Treatment physical therapy  -KG       Row Name 24 1330          General Information    Patient Profile Reviewed yes  -KG     Prior Level of Function independent:;all household mobility;ADL's;min assist:;dressing;bathing;dependent:;home management;cooking;cleaning  walker  -KG     Existing Precautions/Restrictions other (see comments);oxygen therapy device and L/min   recent ORIF R hip  -KG     Barriers to Rehab medically complex  -KG       Row Name 09/16/24 1330          Living Environment    People in Home child(chidi), adult;other (see comments)  lives with daughter  -KG       Row Name 09/16/24 1330          Home Main Entrance    Number of Stairs, Main Entrance other (see comments)  ramp  -KG       Row Name 09/16/24 1330          Stairs Within Home, Primary    Number of Stairs, Within Home, Primary none  -KG       Row Name 09/16/24 1330          Cognition    Orientation Status (Cognition) oriented to;person;disoriented to;place;situation;time;other (see comments)  baseline  -KG       Row Name 09/16/24 1330          Safety Issues, Functional Mobility    Safety Issues Affecting Function (Mobility) insight into deficits/self-awareness  -KG     Impairments Affecting Function (Mobility) endurance/activity tolerance;shortness of breath;strength  -KG               User Key  (r) = Recorded By, (t) = Taken By, (c) = Cosigned By      Initials Name Provider Type    KG Shantal Castanon Physical Therapist                   Mobility       Row Name 09/16/24 1336          Bed Mobility    Bed Mobility rolling left;rolling right  -KG     Rolling Left [place] (Bed Mobility) maximum assist (25% patient effort);2 person assist;verbal cues  -KG     Rolling Right [place] (Bed Mobility) maximum assist (25% patient effort);2 person assist;verbal cues  -KG     Assistive Device (Bed Mobility) head of bed elevated;draw sheet  -KG     Comment, (Bed Mobility) pt very weak with bed mobility  -KG       Row Name 09/16/24 1336          Transfers    Comment, (Transfers) not performed: pt feeling very SOA and daughter requested deferring sitting EOB/ transfer attempts  -KG       Row Name 09/16/24 1336          Sit-Stand Transfer    Sit-Stand [place] (Transfers) unable to assess  -KG               User Key  (r) = Recorded By, (t) = Taken By, (c) = Cosigned By      Initials Name Provider Type    KG  Shantal Castanon Physical Therapist                   Obj/Interventions       Row Name 09/16/24 1339          Range of Motion Comprehensive    General Range of Motion no range of motion deficits identified  -KG       Row Name 09/16/24 1339          Strength Comprehensive (MMT)    General Manual Muscle Testing (MMT) Assessment lower extremity strength deficits identified  -KG     Comment, General Manual Muscle Testing (MMT) Assessment 2+/5, unable to perform SLR  -KG       Row Name 09/16/24 1339          Motor Skills    Therapeutic Exercise other (see comments)  ankle pumps, heel slides, quad sets, glut sets  -KG               User Key  (r) = Recorded By, (t) = Taken By, (c) = Cosigned By      Initials Name Provider Type    KG Shantal Castanon Physical Therapist                   Goals/Plan    No documentation.                  Clinical Impression       Row Name 09/16/24 1340          Pain    Pretreatment Pain Rating 0/10 - no pain  -KG     Posttreatment Pain Rating 0/10 - no pain  -KG       Row Name 09/16/24 1340          Plan of Care Review    Plan of Care Reviewed With patient;family  -KG     Progress no change  -KG     Outcome Evaluation PT eval performed: Pt has had significant recent functional decline.  Pt had been recovering from [facility] and had returned to ambulating with walker.  Pt very weak and SOA today.  Daughter requested we defer OOB mobility today, max-A x2 for rolling in bed.  very weakn, Unable to perfrom SLR.  Pending medical improvement, recommend return to [facility] at d/c.  -KG       Row Name 09/16/24 1340          Therapy Assessment/Plan (PT)    Patient/Family Therapy Goals Statement (PT) return to ambulating  -KG     Rehab Potential (PT) good, to achieve stated therapy goals  -KG     Criteria for Skilled Interventions Met (PT) yes;meets criteria;skilled treatment is necessary  -KG     Therapy Frequency (PT) daily  -KG               User Key  (r) = Recorded By, (t) =  Taken By, (c) = Cosigned By      Initials Name Provider Type    Shantal George Physical Therapist                   Outcome Measures    No documentation.                                Physical Therapy Education       Title: PT OT SLP Therapies (In Progress)       Topic: Physical Therapy (In Progress)       Point: Mobility training (In Progress)       Learning Progress Summary             Patient Acceptance, E, NR by KG at 9/16/2024 1425                         Point: Home exercise program (In Progress)       Learning Progress Summary             Patient Acceptance, E, NR by KG at 9/16/2024 1425                         Point: Body mechanics (In Progress)       Learning Progress Summary             Patient Acceptance, E, NR by KG at 9/16/2024 1425                         Point: Precautions (In Progress)       Learning Progress Summary             Patient Acceptance, E, NR by KG at 9/16/2024 1425                                         User Key       Initials Effective Dates Name Provider Type Discipline    KG 01/04/23 -  Shantal Castanon Physical Therapist PT                  PT Recommendation and Plan     Plan of Care Reviewed With: patient, family  Progress: no change  Outcome Evaluation: PT eval performed: Pt has had significant recent functional decline.  Pt had been recovering from [facility] hip OR[facility] and had returned to ambulating with walker.  Pt very weak and SOA today.  Daughter requested we defer OOB mobility today, max-A x2 for rolling in bed.  very weakn, Unable to perfrom SLR.  Pending medical improvement, recommend return to [facility] at d/c.     Time Calculation:         PT Charges       Row Name 09/16/24 1426             Time Calculation    Start Time 1250  -KG      PT Received On 09/16/24  -KG      PT Goal Re-Cert Due Date 09/26/24  -KG                User Key  (r) = Recorded By, (t) = Taken By, (c) = Cosigned By      Initials Name Provider Type    Shatnal George Physical  Therapist                  Therapy Charges for Today       Code Description Service Date Service Provider Modifiers Qty    83405796165 HC PT EVAL LOW COMPLEXITY 4 9/16/2024 Shantal Castanon GP 1            PT G-Codes  AM-PAC 6 Clicks Score (PT): 11  PT Discharge Summary  Anticipated Discharge Disposition (PT): skilled nursing facility    Shantal Castanon  9/16/2024

## 2024-09-16 NOTE — PLAN OF CARE
Goal Outcome Evaluation:              Outcome Evaluation: pt has been confused but was able to answer who she was. 2 NC. pts fingers and toes were dusky.  was notified. orderes placed. pts family has been at bedside. pt has been plesant.

## 2024-09-16 NOTE — PLAN OF CARE
Goal Outcome Evaluation:  Plan of Care Reviewed With: patient, son        Progress:  (eval)         Anticipated Discharge Disposition (SLP): skilled nursing facility          SLP Swallowing Diagnosis: functional pharyngeal phase, functional oral phase (09/16/24 1500)

## 2024-09-16 NOTE — CONSULTS
"          Clinical Nutrition Assessment     Patient Name: Jaqui Matute  YOB: 1931  MRN: [id number]  Date of Encounter: 09/16/24 18:25 EDT  Admission date: 9/15/2024  Reason for Visit: Identified at risk by screening criteria, Malnutrition Severity Assessment, Consult, Difficulty chewing/swallowing    Assessment   Nutrition Assessment   Admission Diagnosis:  Unstable angina [I20.0]  Bilateral pleural effusion [J90]    Problem List:    Unstable angina    Acquired hypothyroidism    Anxiety    Aortic aneurysm    Atrial fibrillation    Dementia    GERD without esophagitis    HTN (hypertension)    Hyperlipidemia    Anemia    Acute congestive heart failure    Bilateral pleural effusion      PMH:   She  has a past medical history of Allergic (15MAR23), Anemia, Arthritis (1995?), Breast cancer, Cancer, Colon polyp, Dementia, Diverticulosis, GERD (gastroesophageal reflux disease), Headache, Hyperlipidemia, Hypertension, Hypothyroidism, and Prediabetes.    PSH:  She  has a past surgical history that includes Femur fracture surgery; Replacement total knee; Total hip arthroplasty; Mastectomy (Left); Thyroidectomy; Hysterectomy; Breast surgery (2019); and Cholecystectomy.    Applicable Nutrition History:   9/13 [facility] ground, thin liq    Anthropometrics     Height: Height: 165.1 cm (65\")  Last Filed Weight: Weight: 70.8 kg (156 lb) (09/16/24 0948)  Method: Weight Method: Bed scale  BMI: BMI (Calculated): 26    UBW:     Weight      Weight (kg) Weight (lbs) Weight Method   2/9/2023 63.504 kg  140 lb     2/16/2024 63.05 kg  139 lb     7/10/2024 65 kg  143 lb 4.8 oz  Stated    8/8/2024 65 kg  143 lb 4.8 oz  Estimated    9/15/2024 62.596 kg  138 lb  Stated    9/16/2024 70.761 kg  156 lb  Bed scale     71.033 kg  156 lb 9.6 oz       Weight change: No significant changes    Nutrition Focused Physical Exam    Date:  09/16/24       Patient meets criteria for malnutrition diagnosis, see MSA note.     Subjective "   Reported/Observed/Food/Nutrition Related History:     09/16/24  Spoke with patient and family at bedside. Family reported patient hasn't been eating normally since admission to the [facility]. Sleeps through breakfast, will eat a few bites at other meals with no snacks. SLP following. NKFA.    Current Nutrition Prescription   PO: Diet: Regular/House; Texture: Mechanical Ground (NDD 2); Fluid Consistency: Thin (IDDSI 0)  Oral Nutrition Supplement: n/a  Intake: Insufficient data    Assessment & Plan   Nutrition Diagnosis   Date:   Updated:  Problem Malnutrition, acute severe   Etiology Dementia and recent surgeries   Signs/Symptoms </=50% of EEN x >/=5d, moderate muscle wasting, and moderate subcutaneous fat loss   Status: New    Goal:   Nutrition to support treatment and Establish PO      Nutrition Intervention      Follow treatment progress, Care plan reviewed, Interview for preferences, Encourage intake    Patient meets malnutrition criteria, see MSA for full assessment.  Following to assess need for ONS  Encourage adequate PO intake    Monitoring/Evaluation:   Per protocol, PO intake, Weight, Symptoms, POC/GOC, Swallow function    Mindy Griffin MS,RD,LD  Time Spent: 30min

## 2024-09-16 NOTE — THERAPY EVALUATION
Patient Name: Jaqui Matute  : 1931    MRN: [id number]                              Today's Date: 2024       Admit Date: 9/15/2024    Visit Dx:     ICD-10-CM ICD-9-CM   1. Acute congestive heart failure, unspecified heart failure type  I50.9 428.0   2. Acute chest pain  R07.9 786.50   3. Dementia without behavioral disturbance, psychotic disturbance, mood disturbance, or anxiety, unspecified dementia severity, unspecified dementia type  F03.90 294.20     Patient Active Problem List   Diagnosis    Acquired hypothyroidism    Anxiety    Aortic aneurysm    Atrial fibrillation    Dementia    Diverticulosis    Hiatal hernia    GERD without esophagitis    HTN (hypertension)    Hyperlipidemia    Osteoporosis    T12 compression fracture    Unstable angina    Anemia    Acute congestive heart failure    Bilateral pleural effusion     Past Medical History:   Diagnosis Date    Allergic     Seasonal allergies    Anemia     Arthritis ?    Osteoarthritis - hands    Breast cancer     Cancer     Colon polyp     Dementia     Diverticulosis     GERD (gastroesophageal reflux disease)     Headache     Hyperlipidemia     Hypertension     Hypothyroidism     Prediabetes      Past Surgical History:   Procedure Laterality Date    BREAST SURGERY      CHOLECYSTECTOMY      FEMUR FRACTURE SURGERY      HYSTERECTOMY      MASTECTOMY Left     REPLACEMENT TOTAL KNEE      THYROIDECTOMY      TOTAL HIP ARTHROPLASTY        General Information       Row Name 24 1656          OT Time and Intention    Document Type evaluation  -KAREN     Mode of Treatment occupational therapy  -KAREN       Row Name 24 1656          General Information    Patient Profile Reviewed yes  -KAREN     Prior Level of Function mod assist:;ADL's;independent:;bed mobility;all household mobility  Admit from [facility]; prior to recent hospitalization, pt required assist for LBD, showers, and toileting hygiene, ambulated household distances using FWW  -KAREN      Existing Precautions/Restrictions fall;other (see comments)  hx dementia; recent ORIF R hip  -KAREN     Barriers to Rehab medically complex;previous functional deficit  -KAREN       Row Name 09/16/24 1656          Occupational Profile    Environmental Supports and Barriers (Occupational Profile) Lives with daughter in single level homw; has FWW, w/c, ramp, TTB in tub shower  -KAREN       Row Name 09/16/24 1656          Living Environment    People in Home child(chidi), adult  -KAREN       Row Name 09/16/24 1656          Home Main Entrance    Number of Stairs, Main Entrance other (see comments)  Ramp  -KAREN       Row Name 09/16/24 1656          Stairs Within Home, Primary    Number of Stairs, Within Home, Primary none  -KAREN       Row Name 09/16/24 1656          Cognition    Orientation Status (Cognition) oriented to;person;disoriented to;place;situation;time;other (see comments)  baseline  -KAREN       Row Name 09/16/24 1656          Safety Issues, Functional Mobility    Safety Issues Affecting Function (Mobility) awareness of need for assistance;insight into deficits/self-awareness;safety precaution awareness  -KAREN     Impairments Affecting Function (Mobility) endurance/activity tolerance;postural/trunk control;shortness of breath;strength  -KAREN               User Key  (r) = Recorded By, (t) = Taken By, (c) = Cosigned By      Initials Name Provider Type    Brittanie Gomez OT Occupational Therapist                     Mobility/ADL's       Row Name 09/16/24 1701          Bed Mobility    Bed Mobility rolling left;rolling right  -KAREN     Rolling Left [place] (Bed Mobility) maximum assist (25% patient effort);2 person assist;verbal cues;nonverbal cues (demo/gesture)  -KAREN     Rolling Right [place] (Bed Mobility) maximum assist (25% patient effort);2 person assist;verbal cues;nonverbal cues (demo/gesture)  -KAREN     Assistive Device (Bed Mobility) draw sheet;bed rails  -KAREN       Row Name 09/16/24 1701          Transfers    Comment,  (Transfers) Pt's daughter requested deferring transfer to EOB or chair d/t pt's SOA  -KAREN       Row Name 09/16/24 1701          Sit-Stand Transfer    Sit-Stand [place] (Transfers) unable to assess  -KAREN       Row Name 09/16/24 1701          Functional Mobility    Functional Mobility- Ind. Level not tested  -KAREN       Row Name 09/16/24 1701          Activities of Daily Living    BADL Assessment/Intervention grooming  -KAREN       Row Name 09/16/24 1701          Grooming Assessment/Training    [place] Level (Grooming) wash face, hands;dependent (less than 25% patient effort)  -     Oral Care patient refused intervention  -     Position (Grooming) sitting up in bed  -               User Key  (r) = Recorded By, (t) = Taken By, (c) = Cosigned By      Initials Name Provider Type    Brittanie Gomez OT Occupational Therapist                   Obj/Interventions       Row Name 09/16/24 1703          Sensory Assessment (Somatosensory)    Sensory Assessment (Somatosensory) unable/difficult to assess  -KAREN       Row Name 09/16/24 1703          Vision Assessment/Intervention    Visual Impairment/Limitations unable/difficult to assess  -KAREN       Row Name 09/16/24 1703          Range of Motion Comprehensive    General Range of Motion bilateral upper extremity ROM WFL  -KAREN       Row Name 09/16/24 1703          Strength Comprehensive (MMT)    Comment, General Manual Muscle Testing (MMT) Assessment BUE's grossly 3/5  -               User Key  (r) = Recorded By, (t) = Taken By, (c) = Cosigned By      Initials Name Provider Type    Brittanie Gomez OT Occupational Therapist                   Goals/Plan       Row Name 09/16/24 1709          Transfer Goal 1 (OT)    Activity/Assistive Device (Transfer Goal 1, OT) sit-to-stand/stand-to-sit;toilet;bed-to-chair/chair-to-bed  -KAREN     [place] Level/Cues Needed (Transfer Goal 1, OT) moderate assist (50-74% patient effort)  -KAREN     Time Frame (Transfer Goal 1, OT) long term  goal (LTG);10 days  -KAREN     Progress/Outcome (Transfer Goal 1, OT) new goal  -KAREN       Row Name 09/16/24 1709          Grooming Goal 1 (OT)    Activity/Device (Grooming Goal 1, OT) hair care;oral care;wash face, hands  -KAREN     [place] (Grooming Goal 1, OT) minimum assist (75% or more patient effort)  -KAREN     Time Frame (Grooming Goal 1, OT) short term goal (STG);1 week  -KAREN     Progress/Outcome (Grooming Goal 1, OT) new goal  -KAREN       Row Name 09/16/24 1709          Therapy Assessment/Plan (OT)    Planned Therapy Interventions (OT) activity tolerance training;BADL retraining;functional balance retraining;occupation/activity based interventions;patient/caregiver education/training;ROM/therapeutic exercise;strengthening exercise;transfer/mobility retraining  -KAREN               User Key  (r) = Recorded By, (t) = Taken By, (c) = Cosigned By      Initials Name Provider Type    Brittanie Gomez OT Occupational Therapist                   Clinical Impression       Row Name 09/16/24 1704          Pain Scale: FACES Pre/Post-Treatment    Pain: FACES Scale, Pretreatment 0-->no hurt  -KAREN     Posttreatment Pain Rating 0-->no hurt  -KAREN       Row Name 09/16/24 1704          Plan of Care Review    Plan of Care Reviewed With patient;daughter;son  -KAREN     Outcome Evaluation OT eval completed. Pt presents from rehabilitation setting with significant decline in function, increased weakness and SOA. Pt's daughter present during eval, requested OT defer transition to sit EOB, repositioned in bed with MaxAx2, Dep for washing face and hands. IP OT services warranted. Recommend return to rehab at discharge when medically appropriate.  -KAREN       Row Name 09/16/24 1704          Therapy Assessment/Plan (OT)    Patient/Family Therapy Goal Statement (OT) To return to PLOF  -KAREN     Rehab Potential (OT) fair, will monitor progress closely  -KAREN     Criteria for Skilled Therapeutic Interventions Met (OT) yes;meets criteria;skilled treatment is  necessary  -KAREN     Therapy Frequency (OT) daily  -KAREN       Row Name 09/16/24 1704          Therapy Plan Review/Discharge Plan (OT)    Anticipated Discharge Disposition (OT) inpatient rehabilitation facility  -KAREN       Row Name 09/16/24 1704          Vital Signs    O2 Delivery Pre Treatment nasal cannula  -KAREN     O2 Delivery Intra Treatment nasal cannula  -KAREN     O2 Delivery Post Treatment nasal cannula  -KAREN     Pre Patient Position Supine  -KAREN     Intra Patient Position Side Lying  -KAREN     Post Patient Position Side Lying  -KAREN       Row Name 09/16/24 1704          Positioning and Restraints    Pre-Treatment Position in bed  -KAREN     Post Treatment Position bed  -KAREN     In Bed notified nsg;side lying right;call light within reach;encouraged to call for assist;exit alarm on;with family/caregiver  -KAREN               User Key  (r) = Recorded By, (t) = Taken By, (c) = Cosigned By      Initials Name Provider Type    Brittanie Gomez OT Occupational Therapist                   Outcome Measures       Row Name 09/16/24 1710          How much help from another is currently needed...    Putting on and taking off regular lower body clothing? 1  -KAREN     Bathing (including washing, rinsing, and drying) 1  -KAREN     Toileting (which includes using toilet bed pan or urinal) 1  -KAREN     Putting on and taking off regular upper body clothing 1  -KAREN     Taking care of personal grooming (such as brushing teeth) 1  -KAREN     Eating meals 2  -KAREN     AM-PAC 6 Clicks Score (OT) 7  -KAREN       Row Name 09/16/24 1710          Functional Assessment    Outcome Measure Options AM-PAC 6 Clicks Daily Activity (OT)  -KAREN               User Key  (r) = Recorded By, (t) = Taken By, (c) = Cosigned By      Initials Name Provider Type    Brittanie Gomez OT Occupational Therapist                    Occupational Therapy Education       Title: PT OT SLP Therapies (In Progress)       Topic: Occupational Therapy (In Progress)       Point: ADL training (Done)        Description:   Instruct learner(s) on proper safety adaptation and remediation techniques during self care or transfers.   Instruct in proper use of assistive devices.                  Learning Progress Summary             Patient Acceptance, E, VU by KAREN at 9/16/2024 1711    Comment: OT POC; discharge planning   Family Acceptance, E, VU by KAREN at 9/16/2024 1711    Comment: OT POC; discharge planning                         Point: Home exercise program (Not Started)       Description:   Instruct learner(s) on appropriate technique for monitoring, assisting and/or progressing therapeutic exercises/activities.                  Learner Progress:  Not documented in this visit.              Point: Precautions (Not Started)       Description:   Instruct learner(s) on prescribed precautions during self-care and functional transfers.                  Learner Progress:  Not documented in this visit.              Point: Body mechanics (Not Started)       Description:   Instruct learner(s) on proper positioning and spine alignment during self-care, functional mobility activities and/or exercises.                  Learner Progress:  Not documented in this visit.                              User Key       Initials Effective Dates Name Provider Type Discipline    KAREN 06/16/21 -  Brittanie Ahumada OT Occupational Therapist OT                  OT Recommendation and Plan  Planned Therapy Interventions (OT): activity tolerance training, BADL retraining, functional balance retraining, occupation/activity based interventions, patient/caregiver education/training, ROM/therapeutic exercise, strengthening exercise, transfer/mobility retraining  Therapy Frequency (OT): daily  Plan of Care Review  Plan of Care Reviewed With: patient, daughter, son  Outcome Evaluation: OT eval completed. Pt presents from rehabilitation setting with significant decline in function, increased weakness and SOA. Pt's daughter present during eval, requested OT defer  transition to sit EOB, repositioned in bed with MaxAx2, Dep for washing face and hands. IP OT services warranted. Recommend return to rehab at discharge when medically appropriate.     Time Calculation:   Evaluation Complexity (OT)  Review Occupational Profile/Medical/Therapy History Complexity: expanded/moderate complexity  Assessment, Occupational Performance/Identification of Deficit Complexity: 3-5 performance deficits  Clinical Decision Making Complexity (OT): detailed assessment/moderate complexity  Overall Complexity of Evaluation (OT): moderate complexity     Time Calculation- OT       Row Name 09/16/24 1308             Time Calculation- OT    OT Start Time 1308  -KAREN      OT Received On 09/16/24  -KAREN      OT Goal Re-Cert Due Date 09/26/24  -KAREN         Untimed Charges    OT Eval/Re-eval Minutes 48  -KAREN         Total Minutes    Untimed Charges Total Minutes 48  -KAREN       Total Minutes 48  -KAREN                User Key  (r) = Recorded By, (t) = Taken By, (c) = Cosigned By      Initials Name Provider Type    Brittanie Gomez OT Occupational Therapist                  Therapy Charges for Today       Code Description Service Date Service Provider Modifiers Qty    [id number]  OT EVAL MOD COMPLEXITY 4 9/16/2024 Brittanie Ahumada OT GO 1                 Brittanie Ahumada OT  9/16/2024

## 2024-09-16 NOTE — THERAPY EVALUATION
Acute Care - Speech Language Pathology   Swallow Initial Evaluation [facility]  Clinical Swallow Evaluation       Patient Name: Jaqui Matute  : 1931  MRN: [id number]  Today's Date: 2024               Admit Date: 9/15/2024    Visit Dx:     ICD-10-CM ICD-9-CM   1. Acute congestive heart failure, unspecified heart failure type  I50.9 428.0   2. Acute chest pain  R07.9 786.50   3. Dementia without behavioral disturbance, psychotic disturbance, mood disturbance, or anxiety, unspecified dementia severity, unspecified dementia type  F03.90 294.20     Patient Active Problem List   Diagnosis    Acquired hypothyroidism    Anxiety    Aortic aneurysm    Atrial fibrillation    Dementia    Diverticulosis    Hiatal hernia    GERD without esophagitis    HTN (hypertension)    Hyperlipidemia    Osteoporosis    T12 compression fracture    Unstable angina    Anemia    Acute congestive heart failure    Bilateral pleural effusion     Past Medical History:   Diagnosis Date    Allergic     Seasonal allergies    Anemia     Arthritis ?    Osteoarthritis - hands    Breast cancer     Cancer     Colon polyp     Dementia     Diverticulosis     GERD (gastroesophageal reflux disease)     Headache     Hyperlipidemia     Hypertension     Hypothyroidism     Prediabetes      Past Surgical History:   Procedure Laterality Date    BREAST SURGERY      CHOLECYSTECTOMY      FEMUR FRACTURE SURGERY      HYSTERECTOMY      MASTECTOMY Left     REPLACEMENT TOTAL KNEE      THYROIDECTOMY      TOTAL HIP ARTHROPLASTY         SLP Recommendation and Plan  SLP Swallowing Diagnosis: functional pharyngeal phase, functional oral phase (24 1500)  SLP Diet Recommendation: mechanical ground textures, thin liquids (24 1500)  Recommended Precautions and Strategies: upright posture during/after eating, small bites of food and sips of liquid, check mouth frequently for oral residue/pocketing (24 1500)  SLP Rec. for Method of  Medication Administration: meds whole, as tolerated (09/16/24 1500)     Monitor for Signs of Aspiration: yes, notify SLP if any concerns (09/16/24 1500)     Swallow Criteria for Skilled Therapeutic Interventions Met: baseline status (09/16/24 1500)  Anticipated Discharge Disposition (SLP): skilled nursing facility (09/16/24 1500)  Rehab Potential/Prognosis, Swallowing: good, to achieve stated therapy goals (09/16/24 1500)  Therapy Frequency (Swallow): evaluation only (09/16/24 1500)     Oral Care Recommendations: Oral Care BID/PRN, Toothbrush (09/16/24 1500)                                        Plan of Care Reviewed With: patient, son  Progress:  (eval)      SWALLOW EVALUATION (Last 72 Hours)       SLP Adult Swallow Evaluation       Row Name 09/16/24 1500                   Rehab Evaluation    Document Type evaluation  -AV        Subjective Information no complaints  -AV        Patient Observations alert;cooperative  -AV        Patient/Family/Caregiver Comments/Observations son present  -AV        Patient Effort good  -AV           General Information    Patient Profile Reviewed yes  -AV        Pertinent History Of Current Problem GERD, diverticulosis, hiatal hernia, dementia, HTN, anxiety  -AV        Current Method of Nutrition mechanical ground textures;thin liquids  -AV        Precautions/Limitations, Vision WFL;for purposes of eval  -AV        Precautions/Limitations, Hearing WFL;for purposes of eval  -AV        Prior Level of Function-Communication cognitive-linguistic impairment  baseline dementia  -AV        Prior Level of Function-Swallowing mechanical ground textures;thin liquids  -AV        Plans/Goals Discussed with patient;family  -AV        Barriers to Rehab medically complex;cognitive status  -AV        Patient's Goals for Discharge patient did not state  -AV        Family Goals for Discharge other (see comments)  reduce pocketing  -AV           Pain    Additional Documentation Pain Scale: FACES  Pre/Post-Treatment (Group)  -AV           Pain Scale: FACES Pre/Post-Treatment    Pain: FACES Scale, Pretreatment 0-->no hurt  -AV        Posttreatment Pain Rating 0-->no hurt  -AV           Oral Motor Structure and Function    Dentition Assessment upper dentures/partial in place;lower dentures/partial in place  -AV        Secretion Management WNL/WFL  -AV        Mucosal Quality moist, healthy  -AV        Volitional Swallow unable to elicit  -AV        Volitional Cough unable to elicit  -AV           Oral Musculature and Cranial Nerve Assessment    Oral Motor General Assessment WFL  -AV           General Eating/Swallowing Observations    Eating/Swallowing Skills fed by SLP  -AV        Positioning During Eating upright 90 degree  -AV        Utensils Used spoon;cup;straw  -AV        Consistencies Trialed regular textures;pureed;thin liquids  -AV           Clinical Swallow Eval    Oral Prep Phase WFL  -AV        Oral Transit WFL  -AV        Oral Residue WFL  no residue during CSE. D/w pt and son strategies for removal of pocketing  -AV        Pharyngeal Phase no overt signs/symptoms of pharyngeal impairment  -AV        Esophageal Phase unremarkable  -AV           SLP Evaluation Clinical Impression    SLP Swallowing Diagnosis functional pharyngeal phase;functional oral phase  -AV        Functional Impact no impact on function  -AV        Rehab Potential/Prognosis, Swallowing good, to achieve stated therapy goals  -AV        Swallow Criteria for Skilled Therapeutic Interventions Met baseline status  -AV           Recommendations    Therapy Frequency (Swallow) evaluation only  -AV        SLP Diet Recommendation mechanical ground textures;thin liquids  -AV        Recommended Precautions and Strategies upright posture during/after eating;small bites of food and sips of liquid;check mouth frequently for oral residue/pocketing  -AV        Oral Care Recommendations Oral Care BID/PRN;Toothbrush  -AV        SLP Rec. for Method  of Medication Administration meds whole;as tolerated  -AV        Monitor for Signs of Aspiration yes;notify SLP if any concerns  -AV        Anticipated Discharge Disposition (SLP) skilled nursing facility  -AV                  User Key  (r) = Recorded By, (t) = Taken By, (c) = Cosigned By      Initials Name Effective Dates    AV Kelly Kelly MS CCC-SLP 06/16/21 -                     EDUCATION  The patient has been educated in the following areas:   Dysphagia (Swallowing Impairment) Oral Care/Hydration.                Time Calculation:    Time Calculation- SLP       Row Name 09/16/24 1521             Time Calculation- SLP    SLP Start Time 1445  -AV      SLP Received On 09/16/24  -AV         Untimed Charges    SLP Eval/Re-eval  ST Eval Oral Pharyng Swallow - 53508  -AV      [id number]-[id number] Eval Oral Pharyng Swallow Minutes 38  -AV         Total Minutes    Untimed Charges Total Minutes 38  -AV       Total Minutes 38  -AV                User Key  (r) = Recorded By, (t) = Taken By, (c) = Cosigned By      Initials Name Provider Type    AV Kelly Kelly MS CCC-SLP Speech and Language Pathologist                    Therapy Charges for Today       Code Description Service Date Service Provider Modifiers Qty    [id number] HC ST EVAL ORAL PHARYNG SWALLOW 3 9/16/2024 Kelly Kelly MS CCC-SLP GN 1                 Kelly Lamas MS CCC-BALJINDER  9/16/2024

## 2024-09-16 NOTE — CASE MANAGEMENT/SOCIAL WORK
Discharge Planning Assessment  [facility]     Patient Name: Jaqui Matute  MRN: [id number]  Today's Date: 9/16/2024    Admit Date: 9/15/2024    Plan: IDP   Discharge Needs Assessment       Row Name 09/16/24 1153       Living Environment    People in Home child(chidi), adult    Name(s) of People in Home maxwell Reid [phone]    Current Living Arrangements home    Primary Care Provided by child(chidi)    Provides Primary Care For no one, unable/limited ability to care for self    Family Caregiver if Needed child(chidi), adult    Family Caregiver Names maxwell Reid    Quality of Family Relationships helpful;involved;supportive    Able to Return to Prior Arrangements yes       Transition Planning    Patient/Family Anticipates Transition to inpatient rehabilitation facility    Patient/Family Anticipated Services at Transition     Transportation Anticipated health plan transportation       Discharge Needs Assessment    Readmission Within the Last 30 Days no previous admission in last 30 days    Equipment Currently Used at Home bp cuff;wheelchair;walker, rolling;ramp;pulse ox    Concerns to be Addressed discharge planning    Outpatient/Agency/Support Group Needs skilled nursing facility;inpatient rehabilitation facility                   Discharge Plan       Row Name 09/16/24 1155       Plan    Plan IDP    Patient/Family in Agreement with Plan yes    Plan Comments Spoke with patient and daughter, Elly, at bedside to initiate discharge planning. Patient lives with her daughter, Elly, in [facility]. Prior to admission, patient was at [facility] rehab. Per daughter, patient recently had hip replacement with post operative complications that has made her more dependent with ADL's. Patient uses a rolling walker for assistance with mobility, and has a wheelchair, ramp, shower chair, and bp cuff available at home. Confirmed PCP is MAGI Nunez. Verified insurance  is Medicare A&B. Patient has Rx coverage and has presciptions filled at [facility]. Daughter will like for her mother to return to the [facility] for rehab prior to returning home. Will wait for therapy notes to make appropiate referral. CM will continue to follow and assist with discharge needs.    Final Discharge Disposition Code 03 - skilled nursing facility (SNF)                  Continued Care and Services - Admitted Since 9/15/2024    No active coordination exists for this encounter.       Expected Discharge Date and Time       Expected Discharge Date Expected Discharge Time    Sep 18, 2024            Demographic Summary       Row Name 09/16/24 1152       General Information    Arrived From emergency department    Reason for Consult discharge planning    Preferred Language English       Contact Information    Permission Granted to Share Info With ;family/designee                   Functional Status       Row Name 09/16/24 1152       Functional Status    Usual Activity Tolerance fair    Current Activity Tolerance fair       Functional Status, IADL    Medications assistive person    Meal Preparation assistive person    Housekeeping assistive person    Laundry assistive person    Shopping completely dependent       Employment/    Employment Status retired                   Psychosocial    No documentation.                  Abuse/Neglect    No documentation.                  Legal    No documentation.                  Substance Abuse    No documentation.                  Patient Forms    No documentation.                     Fausto Luna RN

## 2024-09-16 NOTE — PROGRESS NOTES
Malnutrition Severity Assessment    Patient Name:  Jaqui Matute  YOB: 1931  MRN: [id number]  Admit Date:  9/15/2024    Patient meets criteria for : Severe Malnutrition    Comments:  Patient meets acute illness related severe malnutrition with indicators for severe decrease in energy intake, moderate fat and muscle loss.    Malnutrition Severity Assessment  Malnutrition Type: Acute Disease or Injury - Related Malnutrition  Malnutrition Type (Last 8 Hours)       Malnutrition Severity Assessment       Row Name 09/16/24 1822       Malnutrition Severity Assessment    Malnutrition Type Acute Disease or Injury - Related Malnutrition      Row Name 09/16/24 1822       Insufficient Energy Intake     Insufficient Energy Intake Findings Severe    Insufficient Energy Intake  < or equal to 50% of est. energy requirement for > or equal to 5d)      Row Name 09/16/24 1822       Muscle Loss    Loss of Muscle Mass Findings Moderate    [place] Region Moderate - slight depression    Clavicle Bone Region Moderate - some protrusion in females, visible in males    Acromion Bone Region --  mild    Dorsal Hand Region Moderate - slight depression    Patellar Region Moderate - patella more prominent, less muscle definition around patella    Anterior Thigh Region Moderate - mild depression on inner thigh    Posterior Calf Region Moderate - some roundness, slight firmness      Row Name 09/16/24 1822       Fat Loss    Subcutaneous Fat Loss Findings Moderate    Orbital Region  Moderate -  somewhat hollowness, slightly dark circles    Upper Arm Region Moderate - some fat tissue, not ample      Row Name 09/16/24 1822       Declining Functional Status    Declining Functional Status Findings N/A      Row Name 09/16/24 1822       Criteria Met (Must meet criteria for severity in at least 2 of these categories: M Wasting, Fat Loss, Fluid, Secondary Signs, Wt. Status, Intake)    Patient meets criteria for  Severe Malnutrition                     Electronically signed by:  Mindy Griffin MS,RD,LD  09/16/24 18:24 EDT

## 2024-09-17 PROBLEM — E43 SEVERE MALNUTRITION: Status: ACTIVE | Noted: 2024-01-01

## 2024-09-17 NOTE — PROGRESS NOTES
"[facility] Progress Note     LOS: 2 days   Patient Care Team:  Amrit Arredondo APRN as PCP - General (Family Medicine)  PCP:  Amrit Arredondo APRN    Chief Complaint: Follow-up atrial fibrillation, systolic heart failure    Subjective: Patient more awake and alert today.  Work of breathing looks improved.  Still with cyanosis of the feet and toes.      Review of Systems:   All systems have been reviewed and are negative with the exception of those mentioned above.      Objective:    Vital Sign Min/Max for last 24 hours  Temp  Min: 97.2 °F (36.2 °C)  Max: 97.8 °F (36.6 °C)   BP  Min: 141/90  Max: 174/110   Pulse  Min: 91  Max: 113   Resp  Min: 16  Max: 16   SpO2  Min: 89 %  Max: 100 %   No data recorded   Weight  Min: 68 kg (150 lb)  Max: 70.8 kg (156 lb)     Flowsheet Rows      Flowsheet Row First Filed Value   Admission Height 165.1 cm (65\") Documented at 09/15/2024 1742   Admission Weight 62.6 kg (138 lb) Documented at 09/15/2024 1742            Telemetry: Paroxysmal A-fib.  Periods of sinus rhythm.      Intake/Output Summary (Last 24 hours) at 9/17/2024 0909  Last data filed at 9/17/2024 0852  Gross per 24 hour   Intake 240 ml   Output 1152 ml   Net -912 ml     Intake & Output (last 3 days)         09/14 0701  09/15 0700 09/15 0701  09/16 0700 09/16 0701  09/17 0700 09/17 0701  09/18 0700    P.O.    240    Total Intake(mL/kg)    240 (3.5)    Urine (mL/kg/hr)  700 1152 (0.7)     Stool   0     Total Output  700 1152     Net  -700 -1152 +240            Urine Unmeasured Occurrence  1 x 1201 x     Stool Unmeasured Occurrence   1 x              Physical Exam:  Constitutional:       Comments: Frail.   Pulmonary:      Effort: Pulmonary effort is normal.      Comments: Decreased at the bases bilaterally  Cardiovascular:      Tachycardia present. Irregular rhythm.      Comments: Cyanosis of the fingers and toes.  Does have a palpable distal pulse  Edema:     " Pretibial: bilateral 1+ edema of the pretibial area.         LABS/DIAGNOSTIC DATA:  Results from last 7 days   Lab Units 09/17/24  0344 09/16/24  0803 09/15/24  1753   WBC 10*3/mm3 11.80* 11.42* 10.41   HEMOGLOBIN g/dL 11.2* 11.4* 11.0*   HEMATOCRIT % 37.6 37.9 36.4   PLATELETS 10*3/mm3 221 203 218     Lab Results   Lab Value Date/Time    TROPONINT 54 (C) 09/15/2024 2142    TROPONINT 62 (C) 09/15/2024 1823     Results from last 7 days   Lab Units 09/16/24  0803   INR  1.27*     Results from last 7 days   Lab Units 09/17/24  0345 09/16/24  0803 09/15/24  1823   SODIUM mmol/L 151* 148* 152*   POTASSIUM mmol/L 4.2 4.7 4.5   CHLORIDE mmol/L 103 105 108*   CO2 mmol/L 34.0* 33.0* 33.0*   BUN mg/dL 12 13 14   CREATININE mg/dL 0.88 0.97 0.93   CALCIUM mg/dL 8.3 8.4 8.5   BILIRUBIN mg/dL 0.3 0.3 0.3   ALK PHOS U/L 193* 190* 191*   ALT (SGPT) U/L 42* 53* 62*   AST (SGOT) U/L 20 27 32   GLUCOSE mg/dL 97 94 112*             Results from last 7 days   Lab Units 09/15/24  1823   TSH uIU/mL 3.290         Echo:    Left ventricular systolic function is mildly decreased. Calculated left ventricular EF = 42.8% Left ventricular ejection fraction appears to be 41 - 45%.    Left ventricular wall thickness is consistent with mild concentric hypertrophy.    The left atrial cavity is dilated.    Left atrial volume is severely increased.    There is calcification of the aortic valve.    The mitral valve peak gradient is 10 mmHg. The mitral valve mean gradient is 6 mmHg.    Moderate to severe tricuspid valve regurgitation is present.    Estimated right ventricular systolic pressure from tricuspid regurgitation is markedly elevated (>55 mmHg). Calculated right ventricular systolic pressure from tricuspid regurgitation is 65 mmHg.    The aortic root measures 2.7 cm. Mild dilation of the ascending aorta is present.    There is a left pleural effusion. There is a right pleural effusion.      Medication Review:   aspirin, 324 mg, Oral,  Once  digoxin, 125 mcg, Intravenous, Once  furosemide, 40 mg, Intravenous, Q12H  heparin (porcine), 5,000 Units, Subcutaneous, Q12H  levothyroxine, 125 mcg, Oral, Q AM  memantine, 5 mg, Oral, BID  metoprolol succinate XL, 50 mg, Oral, Q24H  oxybutynin XL, 10 mg, Oral, Daily  pantoprazole, 40 mg, Oral, Daily  piperacillin-tazobactam (ZOSYN) 3.375 g IVPB in 100 mL NS MBP (CD), 3.375 g, Intravenous, Q6H  saccharomyces boulardii, 250 mg, Oral, BID  sertraline, 100 mg, Oral, Daily  sodium chloride, 10 mL, Intravenous, Q12H               Unstable angina    Acquired hypothyroidism    Anxiety    Aortic aneurysm    Atrial fibrillation    Dementia    GERD without esophagitis    HTN (hypertension)    Hyperlipidemia    Anemia    Acute congestive heart failure    Bilateral pleural effusion    Severe malnutrition      Assessment/Plan:      Paroxysmal A-fib with RVR  Continue Toprol-XL 25 mg daily  Repeat IV digoxin 125 mcg today  While hospitalized will use Eliquis 2.5 mg twice daily for stroke prevention  Higher risk for anticoagulation outpatient given frequent falls advanced age  Systolic heart failure  EF 41 to 45%  Continue IV Lasix 40 mg every 12 hours  Toprol-XL 50 mg daily  Cyanosis of distal extremities  CTA with runoff pending  Lactate 2.2  Intact radial pulses  Blood cultures pending  Dementia  Complicates all aspects of care      Discussed with Dr. Trinidad will follow      Addendum.  Midmorning patient went down for CT scan.  While in the CT scan she became bradycardic and developed respiratory distress requiring short-term ACLS measures.  After return of spontaneous circulation DNR status was confirmed.  Met with Dr. Trinidad and patient's daughter at bedside.  Currently on BiPAP but her daughter would like for her mom to have comfort measures only.  She is hypoxic at this time with increased work of breathing.  Agree with comfort measures.    Offered my sympathies.        uLkas Sellers MD [facility]  09/17/24

## 2024-09-17 NOTE — PROGRESS NOTES
[facility] Medicine Services  PROGRESS NOTE    Patient Name: Jaqui Matute  : 1931  MRN: [id number]    Date of Admission: 9/15/2024  Primary Care Physician: Amrit Arredondo APRN    Subjective   Subjective     CC:  Chest pain    HPI: Code blue called in CT scan this morning for PEA with CPR initiated and ROSC.  According to staff, patient experienced decreased heart rate after laying down on the CT table for imaging.      Post code, patient remained unresponsive with apneic breathing.  Daughter reiterated DNR/DNI status, agreeable to Pallitive Consult with primary goal of keeping Ms Matute comfortable.      Objective   Objective     Vital Signs:   Temp:  [97.2 °F (36.2 °C)-97.8 °F (36.6 °C)] 97.8 °F (36.6 °C)  Heart Rate:  [] 89  Resp:  [16-18] 18  BP: (141-213)/() 213/141  Flow (L/min):  [2] 2     Physical Exam:  Frail, ill appearing  MM slightly dry  Wet breath sounds bilaterally  Irregularly irregular  Abd soft, NT  Obtunded, does not respond to questions. Some occasional spontaneous movement of arms  Fingertips remain cyanotic  + LE edema    Results Reviewed:  LAB RESULTS:      Lab 24  0803 09/15/24  1753   WBC 11.80* 11.42* 10.41   HEMOGLOBIN 11.2* 11.4* 11.0*   HEMATOCRIT 37.6 37.9 36.4   PLATELETS 221 203 218   NEUTROS ABS  --  8.39* 8.00*   IMMATURE GRANS (ABS)  --  0.10* 0.08*   LYMPHS ABS  --  0.75 0.64*   MONOS ABS  --  1.31* 0.96*   EOS ABS  --  0.75* 0.63*   .0* 104.1* 104.6*   LACTATE 2.2*  --   --    PROTIME  --  16.0*  --          Lab 24  0803 09/15/24  1823   SODIUM 151* 148* 152*   POTASSIUM 4.2 4.7 4.5   CHLORIDE 103 105 108*   CO2 34.0* 33.0* 33.0*   ANION GAP 14.0 10.0 11.0   BUN 12 13 14   CREATININE 0.88 0.97 0.93   EGFR 61.4 54.6* 57.4*   GLUCOSE 97 94 112*   CALCIUM 8.3 8.4 8.5   MAGNESIUM  --   --  1.6*   TSH  --   --  3.290         Lab 24  0345 24  0803 09/15/24  1823   TOTAL  PROTEIN 6.2 6.1 6.3   ALBUMIN 3.6 3.5 3.7   GLOBULIN  --  2.6 2.6   ALT (SGPT) 42* 53* 62*   AST (SGOT) 20 27 32   BILIRUBIN 0.3 0.3 0.3   INDIRECT BILIRUBIN 0.1  --   --    BILIRUBIN DIRECT 0.2  --   --    ALK PHOS 193* 190* 191*   LIPASE  --   --  11*         Lab 09/16/24  0803 09/15/24  2142 09/15/24  1823   PROBNP  --   --  5,163.0*   HSTROP T  --  54* 62*   PROTIME 16.0*  --   --    INR 1.27*  --   --              Lab 09/15/24  2142 09/15/24  1823   IRON  --  21*   IRON SATURATION (TSAT)  --  6*   TIBC  --  332   TRANSFERRIN  --  223   FERRITIN  --  96.49   FOLATE 12.50  --    VITAMIN B 12 1,539*  --          Lab 09/17/24  1212   PH, ARTERIAL 7.101*   PO2 ART 50.3*   FIO2 100   HCO3 ART 35.7*   BASE EXCESS ART 3.0*   CARBOXYHEMOGLOBIN 1.5     Brief Urine Lab Results  (Last result in the past 365 days)        Color   Clarity   Blood   Leuk Est   Nitrite   Protein   CREAT   Urine HCG        07/11/24 0430 Dark Yellow   Clear     Trace                       Microbiology Results Abnormal       None            CT Angio Abdominal Aorta Bilateral Iliofem Runoff    Result Date: 9/17/2024  CT ANGIO ABDOMINAL AORTA BILAT ILIOFEM RUNOFF Date of Exam: 9/17/2024 11:27 AM EDT Indication: cool right foot.  recent hip surgery. Comparison: CT abdomen and pelvis 8/8/2024 Technique: CTA of the abdomen, pelvis and both lower extremities was performed after the uneventful intravenous administration of 145 cc Isovue-370. Reconstructed coronal and sagittal images were also obtained. In addition, a 3-D volume rendered image was created for interpretation. Automated exposure control and iterative reconstruction methods were used. Findings: AORTA:  Normal in caliber throughout. No dissection or penetrating ulcer identified.  There is moderate to advanced atherosclerotic disease throughout. MESENTERIC/RENAL VESSELS:  Normal in caliber. No dissection or significant stenosis identified. PELVIC VESSELS: Normal in caliber. No dissection or  significant stenosis identified. IVC:  Normal caliber. RIGHT LOWER EXTREMITY: There is moderate superficial femoral artery atherosclerotic disease with areas of up to 50% stenosis. The anterior tibial and peroneal arteries are patent at the level of the ankle. The posterior tibial artery is occluded proximally. LEFT LOWER EXTREMITY: There is moderate superficial femoral artery atherosclerotic disease with areas of up to 50% stenosis. The anterior tibial and peroneal artery are patent at the level of the ankle. Posterior tibial artery is occluded proximally. LOWER THORAX: Bilateral lower lung airspace disease with small to moderate effusions. Correlate for signs of pneumonia. The heart is enlarged. There is reflux of contrast into the IVC and hepatic veins consistent with an element of right heart failure. LIVER: Unremarkable parenchyma without focal lesion. BILIARY/GALLBLADDER: Cholecystectomy SPLEEN: Stable peripherally calcified splenic lesion. PANCREAS:  Unremarkable ADRENAL:  Unremarkable KIDNEYS:  Unremarkable parenchyma with no solid mass identified. No obstruction.  No calculus identified. GASTROINTESTINAL/MESENTERY: There is a sliding hiatal hernia. There is descending and sigmoid diverticulosis without evidence of acute diverticulitis. No evidence of obstruction nor inflammation.  RETROPERITONEUM/LYMPH NODES:  Unremarkable REPRODUCTIVE: Hysterectomy BLADDER:  Unremarkable OSSEUS STRUCTURES: Old fractures as detailed above. No acute osseous process is identified. There is generalized subcutaneous edema throughout the abdominal pelvic wall and the lower extremities. Within the prepubic subcutaneous tissues there has been decrease in size of a deep subcutaneous fluid collection now measuring 1.2 cm in AP diameter (image 122, series 4), previously 3.6 cm. However, there is a single dot of gas within the medial margin of this deep subcutaneous fluid collection suggesting the possibility of a developing abscess.  However, no significant surrounding inflammatory changes are noted. Correlate with symptoms and history. This extends to the outer margin of the right iliac crest.     Impression: Impression: 1.Moderate to advanced atherosclerotic disease. No evidence of greater than 70% stenosis above the knees. 2.Bilateral two-vessel runoff. Each posterior tibial artery is occluded proximally. 3.Smaller subcutaneous fluid collection within the right anterior lower pelvic region. However there is a new single dot of intraluminal gas suggesting this may be a developing abscess. No surrounding inflammatory changes. Correlate with symptoms. 4.Evidence of right heart failure. There are bilateral pleural effusions with lower lung consolidation. Correlate for signs of pneumonia. 5.Other stable chronic findings. Electronically Signed: Itz Chavez MD  9/17/2024 12:43 PM EDT  Workstation ID: [id number]    Duplex Venous Lower Extremity - Bilateral CAR    Result Date: 9/16/2024    Technically difficult study.   No evidence of DVT bilaterally     Adult Transthoracic Echo Complete w/ Color, Spectral and Contrast if necessary per protocol    Result Date: 9/16/2024    Left ventricular systolic function is mildly decreased. Calculated left ventricular EF = 42.8% Left ventricular ejection fraction appears to be 41 - 45%.   Left ventricular wall thickness is consistent with mild concentric hypertrophy.   The left atrial cavity is dilated.   Left atrial volume is severely increased.   There is calcification of the aortic valve.   The mitral valve peak gradient is 10 mmHg. The mitral valve mean gradient is 6 mmHg.   Moderate to severe tricuspid valve regurgitation is present.   Estimated right ventricular systolic pressure from tricuspid regurgitation is markedly elevated (>55 mmHg). Calculated right ventricular systolic pressure from tricuspid regurgitation is 65 mmHg.   The aortic root measures 2.7 cm. Mild dilation of the ascending aorta is  present.   There is a left pleural effusion. There is a right pleural effusion.     CT Angiogram Chest    Result Date: 9/15/2024  CT ANGIOGRAM CHEST Date of Exam: 9/15/2024 7:34 PM EDT Indication: Chest pain with history of ascending aortic aneurysm. Comparison: CT abdomen pelvis 8/8/2024 Technique: CTA of the chest was performed after the uneventful intravenous administration of Isovue contrast. Reconstructed coronal and sagittal images were also obtained. In addition, a 3-D volume rendered image was created for interpretation. Automated  exposure control and iterative reconstruction methods were used. Findings: Study significantly limited by motion. Heart size appears mildly enlarged accentuated by low lung volumes and displacement secondary to a moderate size hiatal hernia. No suspicious pericardial effusion. Evaluation of the aorta is slightly limited particularly in the descending aorta by phase of contrast enhancement. The ascending aorta demonstrates no evidence of dissection. The ascending aorta measures up to 3.8 cm. There is some tortuosity of the aortic arch. Aortic arch measures up to 3.6 cm in diameter. Atherosclerotic changes are noted of the aortic arch and origin of the great vessels. Some limitation secondary to streak artifact from contrast bolus in the left upper extremity noted. No obvious evidence of significant stenosis or occlusion of the great vessels. No evidence of dissection of the aortic arch. Evaluation of the descending aorta somewhat limited by the phase of contrast enhancement with no obvious acute abnormality appreciated. Atherosclerotic changes are noted. No aneurysmal dilation noted. The ascending aorta measures up to 2.8 cm in diameter. The pulmonary arterial system appears well opacified demonstrates no evidence of filling defect. Main pulmonary artery is normal in size measuring 3.3 cm. Evaluation of the hilar structures is limited by motion as well as consolidation and pleural  effusions. No significant hilar or mediastinal adenopathy is appreciated. The esophagus appears to be grossly unremarkable. There are moderate to large bilateral pleural effusions which have increased in size from comparison CT abdomen/pelvis particularly on the right.. There is a moderate size hiatal hernia. Limited imaging of the upper abdomen demonstrates a peripherally calcified cyst within the spleen. Lung volumes are low. Central airways are grossly patent. Motion limited imaging of the lung parenchyma demonstrates dependent opacity compatible with atelectasis and/or edema. Multilevel degenerative change of the spine. Compression deformities of midthoracic vertebral bodies appear similar to remote two-view chest from 2023. No definite acute osseous abnormality noted given limitations of the study     Impression: Impression: 1. Significantly motion compromised exam 2. Mild dilation of the ascending aorta measuring less than 4 cm. No evidence of dissection noted on limited imaging of the ascending aorta or aortic arch. Evaluation of the descending aorta is limited by phase of contrast enhancement. 3. Moderate to large bilateral pleural effusions and associated dependent consolidation likely atelectasis. Component of pulmonary edema also question. Pneumonia cannot be excluded. 4. Cardiomegaly accentuated by low lung volumes. 5. Other findings as above Electronically Signed: Fausto Beverly MD  9/15/2024 8:15 PM EDT  Workstation ID: [id number]    XR Chest 1 View    Result Date: 9/15/2024  XR CHEST 1 VW Date of Exam: 9/15/2024 5:45 PM EDT Indication: Chest Pain Triage Protocol Comparison: 7/10/2024 Findings: Right arm PICC terminates overlying the superior cavoatrial junction. Cardiac silhouette is enlarged. Pulmonary vasculature is indistinct. There are small bilateral pleural effusions and bibasilar atelectasis, edema, or infiltrates. No pneumothorax is seen. No acute osseous lesion is seen. There are senescent  changes of the aorta and skeletal structures. Probable kyphoplasty changes within the upper thoracic spine. Surgical clips within the left axilla.     Impression: Impression: 1.Mild CHF with small bilateral pleural effusions and bibasilar atelectasis, edema, or infiltrates. Electronically Signed: Parish Lamar MD  9/15/2024 6:10 PM EDT  Workstation ID: [id number]     Results for orders placed during the hospital encounter of 09/15/24    Adult Transthoracic Echo Complete w/ Color, Spectral and Contrast if necessary per protocol    Interpretation Summary    Left ventricular systolic function is mildly decreased. Calculated left ventricular EF = 42.8% Left ventricular ejection fraction appears to be 41 - 45%.    Left ventricular wall thickness is consistent with mild concentric hypertrophy.    The left atrial cavity is dilated.    Left atrial volume is severely increased.    There is calcification of the aortic valve.    The mitral valve peak gradient is 10 mmHg. The mitral valve mean gradient is 6 mmHg.    Moderate to severe tricuspid valve regurgitation is present.    Estimated right ventricular systolic pressure from tricuspid regurgitation is markedly elevated (>55 mmHg). Calculated right ventricular systolic pressure from tricuspid regurgitation is 65 mmHg.    The aortic root measures 2.7 cm. Mild dilation of the ascending aorta is present.    There is a left pleural effusion. There is a right pleural effusion.      Current medications:  Scheduled Meds:furosemide, 40 mg, Intravenous, Q12H  pantoprazole, 40 mg, Oral, Daily  sodium chloride, 10 mL, Intravenous, Q12H      Continuous Infusions:   PRN Meds:.  alteplase (CATHFLO/ACTIVASE) injection 2 mg    EPINEPHrine    midazolam    Morphine    nitroglycerin    ondansetron    sodium chloride    sodium chloride    sodium chloride    Assessment & Plan   Assessment & Plan     Active Hospital Problems    Diagnosis  POA    **Unstable angina [I20.0]  Yes    Severe  malnutrition [E43]  Yes    Anemia [D64.9]  Yes    Acute congestive heart failure [I50.9]  Yes    Bilateral pleural effusion [J90]  Yes    Aortic aneurysm [I71.9]  Yes    Acquired hypothyroidism [E03.9]  Yes    Atrial fibrillation [I48.91]  Yes    Dementia [F03.90]  Yes    HTN (hypertension) [I10]  Yes    Anxiety [F41.9]  Yes    Hyperlipidemia [E78.5]  Yes    GERD without esophagitis [K21.9]  Yes      Resolved Hospital Problems   No resolved problems to display.        Brief Hospital Course to date:  Jaqui Matute is a 89 y.o. female with h/o PAF, Dementia, HTN, HLD, Hypothyroid, OA, GERD, DDD, HH, AAA, depression, breast cancer s/p left mastectomy, and recent fall with polytrauma including right acetabular fracture, multiple pelvic fractures and acute versus chronic compression fractures of the vertebrae in July 2024 with readmission to  in August 2024 for abdominal wall seroma who presents with progressively worsening shortness of breath    Chest pain  Acute on chronic heart failure with reduced ejection fraction  Acute respiratory failure with hypercarbia and hypoxia  PEA arrest  - EF 41-45% with mild concentric hypertrophy  - CODE Blue while obtaining CT imaging  - ABG 7.1, pCO2 unable to be calculated, pO2 50  - CTA showed known bilateral effusions with possible bibasilar consolidation  - continued cyanosis  - reviewed care options with daughter at bedside.  Ms Matute appears quite ill and further medical interventions may have limited effect.  Daughter agreeable to BiPAP, however is interested in comfort measures.  Palliative Care consulted.  Also discussed plans going forward with Cardiology Dr Sellers.    Atrial fibrillation  - not currently on anticoagulation, presumably due to fall risk  - metoprolol for rate control, digoxin x 1 this am    Pleural effusions  - bilateral    HTN    HLD    Abdominal wall seroma  Pelvic surgical site infection with hardware  - recently followed at  by ID, cultures grew E  faecalis and K variicoa (I&D 8/10/24)   - zosyn through 9/20 with planned transition to oral antibiotics (lifelong) for suppression  - PICC care performed here  - CT imaging today raised concerns for small area of developing abscess, however prior imaging available for direct comparison of previous fluid collections    Recent fall with multiple fractures  -PT/OT    Hypernatremia  - sodium 152-->148-->152  - suspect poor po intake  - Nutrition consulted    Dementia    Anxiety and depression    GERD    OAB    Hypothyroid    Goals of care: prognosis appears poor.  Revisited with patient, daughter and son at bedside this afternoon.  All questions answered.  Palliative care follows    Expected Discharge Location and Transportation:   Expected Discharge   Expected Discharge Date: 9/18/2024; Expected Discharge Time:      VTE Prophylaxis:  No VTE prophylaxis order currently exists.         AM-PAC 6 Clicks Score (PT): 11 (09/17/24 0835)    CODE STATUS:   Code Status and Medical Interventions: No CPR (Do Not Attempt to Resuscitate); Comfort Measures   Ordered at: 09/17/24 1225     Level Of Support Discussed With:    Next of Kin (If No Surrogate)    Health Care Surrogate     Code Status (Patient has no pulse and is not breathing):    No CPR (Do Not Attempt to Resuscitate)     Medical Interventions (Patient has pulse or is breathing):    Comfort Measures       Dragan Trinidad MD  09/17/24

## 2024-09-17 NOTE — ED PROVIDER NOTES
I was called back to CT 3 as a rapid response as well as eventually CODE BLUE.  On presentation, the patient had CPR in progress while lying on the CT table.  Bag-valve-mask ventilations were being performed.  Initial rhythm of a junctional PEA.  ACLS protocols were continued.  After the completion of the following round of CPR, the patient had ROSC.  I was then notified that the patient was a DNR/DNI per initial review of the chart.  However, there is no further confirmatory information available and family had not arrived down to the ground floor at that point.  Thus, I did check the patient's potential tolerance for ET tube bed and the patient did have a gag reflex present.  At that point, we continued bag-valve-mask, placed a nasal trumpet, and I ordered a BiPAP.  Patient's mental status continued to improve and she did have some purposeful activities including eye contact and moaning.  The daughter did eventually respond down and I talked with her with the hospitalist, Dr. Trinidad.  She confirms the patient's further DNR/DNI status and agrees that no indication or interest in life-sustaining measures including intubation or further CPR.  I did talk with Dr. Trinidad at the bedside.  I reviewed my initial evaluation, findings, and all resuscitation efforts.  All further as per Dr. Trinidad.  Critical care involved with this patient of approximately 20 minutes by the emergency physician.     Saeed Alva MD  09/17/24 3719

## 2024-09-17 NOTE — CONSULTS
Palliative Care Initial Consult   Attending Physician: Dragan Trinidad MD  Referring Provider: Dragan Trinidad      Reason for Referral:  assistance with clarification of goals of care and assistance with withdrawal of life prolonging interventions    Code Status:   Code Status and Medical Interventions: No CPR (Do Not Attempt to Resuscitate); Comfort Measures   Ordered at: 09/17/24 1225     Level Of Support Discussed With:    Next of Kin (If No Surrogate)    Health Care Surrogate     Code Status (Patient has no pulse and is not breathing):    No CPR (Do Not Attempt to Resuscitate)     Medical Interventions (Patient has pulse or is breathing):    Comfort Measures      Advanced Directives: Advance Directive Status: Patient has advance directive, copy requested   Family/Support: Children     Goals of Care:    Goals of Care/Treatment Preferences:    comfort       HPI:   89 yo female with hx PAF, dementia, HTN, HLD, Hypothyroid,AAA, depression, breast CA, hx PAD and recent multitrauma presented from rehab with acute onset substernal chest pain.  Had been requiring O2 for 3 days prior.  Was noted on eval to have compromised LE vasculature with cyanosis. CTA with runoff was ordered.  Unforutnately was unable to tolerate with first rapid called and then code.  Resuscitated and returned to room.  Noted was to have been DNR.  Palliative consulted.      ROS:   Pt unable         Past Medical History:   Diagnosis Date    Allergic 15MAR23    Seasonal allergies    Anemia     Arthritis 1995?    Osteoarthritis - hands    Breast cancer     Cancer     Colon polyp     Dementia     Diverticulosis     GERD (gastroesophageal reflux disease)     Headache     Hyperlipidemia     Hypertension     Hypothyroidism     Prediabetes      Past Surgical History:   Procedure Laterality Date    BREAST SURGERY  2019    CHOLECYSTECTOMY      FEMUR FRACTURE SURGERY      HYSTERECTOMY      MASTECTOMY Left     REPLACEMENT TOTAL KNEE      THYROIDECTOMY       TOTAL HIP ARTHROPLASTY       Social History     Socioeconomic History    Marital status:    Tobacco Use    Smoking status: Former     Current packs/day: 0.00     Average packs/day: 0.1 packs/day for 10.0 years (1.0 ttl pk-yrs)     Types: Cigarettes     Start date: 1956     Quit date: 1966     Years since quittin.7    Smokeless tobacco: Never    Tobacco comments:     Light smoker - ~[phone]   Vaping Use    Vaping status: Never Used   Substance and Sexual Activity    Alcohol use: Not Currently     Comment: Recovering alcoholic    Drug use: Never     Family History   Problem Relation Age of Onset    Cancer Mother     Arthritis Mother     Arthritis Father     Lung cancer Sister     Cancer Son         , 2 years old    Diabetes Son     Diabetes Son         Type I       No Known Allergies      Current Facility-Administered Medications:     alteplase (CATHFLO/ACTIVASE) injection 2 mg, 2 mg, Intracatheter, PRN, Leonel Calvo PA-C, New Syringe/Cartridge at 24 0137    EPINEPHrine (ADRENALIN) injection, , Intravenous, Code / Trauma / Sedation Medication, Saeed Alva MD, 1 mg at 24 1136    furosemide (LASIX) injection 40 mg, 40 mg, Intravenous, Q12H, Lukas Sellers MD, 40 mg at 24 0348    methocarbamol (ROBAXIN) tablet 750 mg, 750 mg, Oral, Q6H PRN, Yaneli Rojo PA-C, 750 mg at 09/15/24 2303    morphine injection 2 mg, 2 mg, Intravenous, Q1H PRN, Barbara Thurston MD    nitroglycerin (NITROSTAT) ointment 0.5 inch, 0.5 inch, Topical, Q6H PRN, Mandy Rich MD    ondansetron (ZOFRAN) injection 4 mg, 4 mg, Intravenous, Q6H PRN, Yaneli Rojo PA-C    pantoprazole (PROTONIX) EC tablet 40 mg, 40 mg, Oral, Daily, Yaneli Rojo PA-C, 40 mg at 24 1009    sodium chloride 0.9 % flush 10 mL, 10 mL, Intravenous, PRN, Jeffrey Robins MD    sodium chloride 0.9 % flush 10 mL, 10 mL, Intravenous, Q12H, Yaneli Rojo PA-C, 10 mL at  "09/17/24 1014    sodium chloride 0.9 % flush 10 mL, 10 mL, Intravenous, PRDarrel LANZA Bennicia L, PA-C    sodium chloride 0.9 % infusion 40 mL, 40 mL, Intravenous, Darrel COLLAZO Bennicia L, PA-C     Palliative Performance Scale Score:  20    BP (!) 213/141 (BP Location: Left arm)   Pulse 89   Temp 97.8 °F (36.6 °C) (Axillary)   Resp 18   Ht 165.1 cm (65\")   Wt 68 kg (150 lb)   SpO2 (!) 73%   BMI 24.96 kg/m²     Intake/Output Summary (Last 24 hours) at 9/17/2024 1228  Last data filed at 9/17/2024 0852  Gross per 24 hour   Intake 240 ml   Output 1152 ml   Net -912 ml       Physical Exam:    General Appearance:    Responded to touch and intermittently to voice from dtr bedside   HEENT:    NC/AT, EOMI, anicteric, MMM, furrowed brow, BiPAP in place   Neck:   supple, trachea midline, no JVD   Lungs:     CTA bilat, diminished in bases; respirations regular, even     Increased work of breathing with accessory use    Heart:    irreg, normal S1 and S2, no M/R/G   Abdomen:     Normal bowel sounds, soft, nontender, nondistended   G/U:   Deferred   MSK/Extremities:   No clubbing , cyanosis, +edema, No wasting   Pulses:   Pulses palpable and equal bilaterally   Skin:   Warm, dry, no mottling   Neurologic:   Attempted to open eyes, no vocalization, cooperative, moves extremities x 4         Labs:   Results from last 7 days   Lab Units 09/17/24  0344   WBC 10*3/mm3 11.80*   HEMOGLOBIN g/dL 11.2*   HEMATOCRIT % 37.6   PLATELETS 10*3/mm3 221     Results from last 7 days   Lab Units 09/17/24  0345   SODIUM mmol/L 151*   POTASSIUM mmol/L 4.2   CHLORIDE mmol/L 103   CO2 mmol/L 34.0*   BUN mg/dL 12   CREATININE mg/dL 0.88   CALCIUM mg/dL 8.3   BILIRUBIN mg/dL 0.3   ALK PHOS U/L 193*   ALT (SGPT) U/L 42*   AST (SGOT) U/L 20   GLUCOSE mg/dL 97     Imaging Results (Last 72 Hours)       Procedure Component Value Units Date/Time    CT Angio Abdominal Aorta Bilateral Iliofem Runoff [[id number]] Resulted: 09/17/24 1211     Updated: 09/17/24 " 1204    CT Angiogram Chest [[id number]] Collected: 09/15/24 2002     Updated: 09/15/24 2026    Narrative:      CT ANGIOGRAM CHEST    Date of Exam: 9/15/2024 7:34 PM EDT    Indication: Chest pain with history of ascending aortic aneurysm.    Comparison: CT abdomen pelvis 8/8/2024    Technique: CTA of the chest was performed after the uneventful intravenous administration of Isovue contrast. Reconstructed coronal and sagittal images were also obtained. In addition, a 3-D volume rendered image was created for interpretation. Automated   exposure control and iterative reconstruction methods were used.      Findings:  Study significantly limited by motion.    Heart size appears mildly enlarged accentuated by low lung volumes and displacement secondary to a moderate size hiatal hernia.    No suspicious pericardial effusion.    Evaluation of the aorta is slightly limited particularly in the descending aorta by phase of contrast enhancement.    The ascending aorta demonstrates no evidence of dissection. The ascending aorta measures up to 3.8 cm.    There is some tortuosity of the aortic arch. Aortic arch measures up to 3.6 cm in diameter.    Atherosclerotic changes are noted of the aortic arch and origin of the great vessels. Some limitation secondary to streak artifact from contrast bolus in the left upper extremity noted. No obvious evidence of significant stenosis or occlusion of the   great vessels.    No evidence of dissection of the aortic arch. Evaluation of the descending aorta somewhat limited by the phase of contrast enhancement with no obvious acute abnormality appreciated. Atherosclerotic changes are noted. No aneurysmal dilation noted. The   ascending aorta measures up to 2.8 cm in diameter.    The pulmonary arterial system appears well opacified demonstrates no evidence of filling defect. Main pulmonary artery is normal in size measuring 3.3 cm.    Evaluation of the hilar structures is limited by motion as well  as consolidation and pleural effusions. No significant hilar or mediastinal adenopathy is appreciated. The esophagus appears to be grossly unremarkable.    There are moderate to large bilateral pleural effusions which have increased in size from comparison CT abdomen/pelvis particularly on the right..    There is a moderate size hiatal hernia. Limited imaging of the upper abdomen demonstrates a peripherally calcified cyst within the spleen.    Lung volumes are low. Central airways are grossly patent.    Motion limited imaging of the lung parenchyma demonstrates dependent opacity compatible with atelectasis and/or edema.    Multilevel degenerative change of the spine. Compression deformities of midthoracic vertebral bodies appear similar to remote two-view chest from 2023. No definite acute osseous abnormality noted given limitations of the study          Impression:      Impression:    1. Significantly motion compromised exam    2. Mild dilation of the ascending aorta measuring less than 4 cm. No evidence of dissection noted on limited imaging of the ascending aorta or aortic arch. Evaluation of the descending aorta is limited by phase of contrast enhancement.    3. Moderate to large bilateral pleural effusions and associated dependent consolidation likely atelectasis. Component of pulmonary edema also question. Pneumonia cannot be excluded.    4. Cardiomegaly accentuated by low lung volumes.    5. Other findings as above        Electronically Signed: Fausto Beverly MD    9/15/2024 8:15 PM EDT    Workstation ID: [id number]    XR Chest 1 View [[id number]] Collected: 09/15/24 1809     Updated: 09/15/24 1813    Narrative:      XR CHEST 1 VW    Date of Exam: 9/15/2024 5:45 PM EDT    Indication: Chest Pain Triage Protocol    Comparison: 7/10/2024    Findings:  Right arm PICC terminates overlying the superior cavoatrial junction. Cardiac silhouette is enlarged. Pulmonary vasculature is indistinct. There are small bilateral  pleural effusions and bibasilar atelectasis, edema, or infiltrates. No pneumothorax is   seen. No acute osseous lesion is seen. There are senescent changes of the aorta and skeletal structures. Probable kyphoplasty changes within the upper thoracic spine. Surgical clips within the left axilla.      Impression:      Impression:  1.Mild CHF with small bilateral pleural effusions and bibasilar atelectasis, edema, or infiltrates.      Electronically Signed: Parish Lamar MD    9/15/2024 6:10 PM EDT    Workstation ID: [id number]                Diagnostics:   No valid procedures specified.    A:     Unstable angina    Acquired hypothyroidism    Anxiety    Aortic aneurysm    Atrial fibrillation    Dementia    GERD without esophagitis    HTN (hypertension)    Hyperlipidemia    Anemia    Acute congestive heart failure    Bilateral pleural effusion    Severe malnutrition         Impression:   S/P PEA arrest with resuscitation  Unstable Angina  Aortic aneurysm  Recent multitrauma   CHF  Afib    Symptoms:  Dyspnea  Debility       P:    Orders adjusted for comfort after discussion with dtr/HCS. Thank you for this consult and allowing us to participate in patient's plan of care. Palliative Care Team will continue to follow patient.       Barbara Thurston MD, 9/17/2024, 12:28 EDT

## 2024-09-17 NOTE — PLAN OF CARE
Problem: Adult Inpatient Plan of Care  Goal: Plan of Care Review  Outcome: Ongoing, Progressing  Goal: Patient-Specific Goal (Individualized)  Outcome: Ongoing, Progressing  Goal: Absence of Hospital-Acquired Illness or Injury  Outcome: Ongoing, Progressing  Intervention: Identify and Manage Fall Risk  Recent Flowsheet Documentation  Taken 9/17/2024 0200 by Ananda Jovel RN  Safety Promotion/Fall Prevention:   safety round/check completed   assistive device/personal items within reach   lighting adjusted  Taken 9/17/2024 0000 by Ananda Jovel RN  Safety Promotion/Fall Prevention:   safety round/check completed   assistive device/personal items within reach  Taken 9/16/2024 2200 by Ananda Jovel RN  Safety Promotion/Fall Prevention: safety round/check completed  Taken 9/16/2024 2000 by Ananda Jovel RN  Safety Promotion/Fall Prevention:   safety round/check completed   assistive device/personal items within reach   clutter free environment maintained  Intervention: Prevent Skin Injury  Recent Flowsheet Documentation  Taken 9/17/2024 0200 by Ananda Jovel RN  Body Position:   weight shifting   legs elevated  Skin Protection: tubing/devices free from skin contact  Taken 9/17/2024 0000 by Ananda Jovel RN  Body Position:   weight shifting   legs elevated  Skin Protection:   tubing/devices free from skin contact   pulse oximeter probe site changed   incontinence pads utilized  Taken 9/16/2024 2200 by Ananda Jovel RN  Body Position: position changed independently  Taken 9/16/2024 2000 by Ananda Jovel RN  Body Position: position changed independently  Skin Protection: tubing/devices free from skin contact  Intervention: Prevent and Manage VTE (Venous Thromboembolism) Risk  Recent Flowsheet Documentation  Taken 9/16/2024 2000 by Ananda Jovel RN  Range of Motion: active ROM (range of motion) encouraged  Intervention: Prevent Infection  Recent Flowsheet Documentation  Taken 9/17/2024 0200 by Ananda Jovel  RN  Infection Prevention: rest/sleep promoted  Taken 9/17/2024 0000 by Ananda Jovel RN  Infection Prevention: rest/sleep promoted  Taken 9/16/2024 2200 by Ananda Jovel RN  Infection Prevention: rest/sleep promoted  Taken 9/16/2024 2000 by Ananda Jovel RN  Infection Prevention:   single patient room provided   rest/sleep promoted  Goal: Optimal Comfort and Wellbeing  Outcome: Ongoing, Progressing  Goal: Readiness for Transition of Care  Outcome: Ongoing, Progressing     Problem: Fall Injury Risk  Goal: Absence of Fall and Fall-Related Injury  Outcome: Ongoing, Progressing  Intervention: Promote Injury-Free Environment  Recent Flowsheet Documentation  Taken 9/17/2024 0200 by Ananda Jovel RN  Safety Promotion/Fall Prevention:   safety round/check completed   assistive device/personal items within reach   lighting adjusted  Taken 9/17/2024 0000 by Ananda Jovel RN  Safety Promotion/Fall Prevention:   safety round/check completed   assistive device/personal items within reach  Taken 9/16/2024 2200 by Ananda Jovel RN  Safety Promotion/Fall Prevention: safety round/check completed  Taken 9/16/2024 2000 by Ananda Jovel RN  Safety Promotion/Fall Prevention:   safety round/check completed   assistive device/personal items within reach   clutter free environment maintained     Problem: Skin Injury Risk Increased  Goal: Skin Health and Integrity  Outcome: Ongoing, Progressing  Intervention: Optimize Skin Protection  Recent Flowsheet Documentation  Taken 9/17/2024 0200 by Ananda Jovel RN  Pressure Reduction Techniques:   weight shift assistance provided   pressure points protected  Head of Bed (HOB) Positioning: HOB elevated  Pressure Reduction Devices: pressure-redistributing mattress utilized  Skin Protection: tubing/devices free from skin contact  Taken 9/17/2024 0000 by Ananda Jovel RN  Pressure Reduction Techniques:   weight shift assistance provided   pressure points protected  Head of Bed (HOB)  Positioning: [facility] lowered  Pressure Reduction Devices:   pressure-redistributing mattress utilized   positioning supports utilized  Skin Protection:   tubing/devices free from skin contact   pulse oximeter probe site changed   incontinence pads utilized  Taken 9/16/2024 2200 by Ananda Jovel RN  Head of Bed ([facility]) Positioning: [facility] elevated  Taken 9/16/2024 2000 by Ananda Jovel RN  Pressure Reduction Techniques:   weight shift assistance provided   positioned off wounds  Head of Bed ([facility]) Positioning: [facility] elevated  Pressure Reduction Devices: pressure-redistributing mattress utilized  Skin Protection: tubing/devices free from skin contact   Goal Outcome Evaluation:

## 2024-09-17 NOTE — PLAN OF CARE
"Goal Outcome Evaluation:  Plan of Care Reviewed With: patient        Progress: no change  Outcome Evaluation: Palliative RN and Dr. CASIMIRO Thurston saw pt at 1230.  New palliative consult for assistance with GOC/comfort measures per Dr. Trinidad.  Pt. was lying in bed on bipap demonstrating increased WOB at rest.  Daughter and HCS Elly was at BS.  She stated that she would like her mom to be comfort measures.  Dr. CASIMIRO Thurston adjusted code status per request and added medications such as morphine and versed for comfort.  Patient's two sons arrived at  to be with her.  Elly spoke of her mother was an  and this is why she chose the name \"Elly\" for her.   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  Patient opened her eyes a moment when youngest son Sumit came to the bedside.  Palliative care to continue to follow for support, POC and ongoing GOC.         Problem: Palliative Care  Goal: Enhanced Quality of Life  Intervention: Promote Advance Care Planning  Flowsheets (Taken 2024 1339)  Life Transition/Adjustment:   palliative care initiated   palliative care discussed           930 Palliative IDT meeting: INEZ Aden RN, PN; SARINA Gutiérrez, APRN; CASIMIRO Thurston MD.; MARTINA Almeida RN;  CARLOS A Louise MDiv, [facility]    After hours, contact Palliative by calling [phone].                          "

## 2024-09-17 NOTE — ACP (ADVANCE CARE PLANNING)
Advance Care Planning     Date of Shared Decision Making Discussion: 09/17/24    Dtr was/were present for the discussion.    Decision Maker: HCS    SDMSI SUMMARY:    Patient/family describes current medical condition as end of life    Patient/family identified the following as being most important to living well: comfortable      Explored understanding, benefits and burdens, goals for treatment, fears and concerns of  interventions as Ongoing resuscitation efforts, intubation, comfort measures, hospice.    Reviewed goals of care for additional medical interventions using decision making framework:      MOST form, Living Will and EMS DNR  introduced if not previously completed.    DECISIONS/RECOMMENDATIONS for FOLLOW-UP:   Dtr with a decision for comfort measures.  Not interested in hospice at this moment as would appear to potentially to have only hours.  If this is not the case will address for potential inpt status later.     Time started: 1221     Time ended: 1237    Total time: 16 minutes

## 2024-09-17 NOTE — SIGNIFICANT NOTE
Exam confirms with auscultation zero audible heart tones and zero audible respirations. Ms.Joanne Matute was pronounced dead at 1800.  MD notified by Patient's RN.    Shashi Cho RN  Clinical House Supervisor  9/17/2024 18:29 EDT

## 2024-09-18 NOTE — PROGRESS NOTES
"Enter Query Response Below      Query Response: hypomagnesemia present on admit  Electronically signed by Dragan Trinidad MD, 24, 7:59 PM EDT.              If applicable, please update the problem list.   Patient: Jaqui Matute        : 1931  Account: [id number]           Admit Date: 9/15/2024        How to Respond to this query:       a. Click New Note     b. Answer query within the yellow box.                c. Update the Problem List, if applicable.    If you have any questions about this query contact me at: [email or web address]     Dr. Trinidad,     89 yo female admitted per H&P dated 9/15/24 for \"unstable angina and acute congestive heart failure.\"  Risk Factors: recent fall with multiple fractures including pelvic fracture with open right anterior column plus posterior cony-transverse acetabular fracture. Abdominal wall seroma. Deep abscess in right pelvic region on IV Zosyn for a total of 6 weeks.  Clinical indicators: Magnesium lab value (9/15/24) 1.6.   Treatment: IV Magnesium sulfate 2 grams x 1 24.    After study, please clarify the following:    Hypomagnesemia, present on admit  Other (please specify)____________    By submitting this query, we are merely seeking further clarification of documentation to accurately reflect all conditions that you are monitoring, evaluating, treating or that extend the hospitalization or utilize additional resources of care. Please utilize your independent clinical judgment when addressing the question(s) above.     This query and your response, once completed, will be entered into the legal medical record.    Sincerely,  Florecita HYDE RN CCDS  System Director Clinical Documentation Integrity Program     "

## 2024-09-18 NOTE — PROGRESS NOTES
"Enter Query Response Below      Query Response: Type II MI    Secondary to systolic heart failure exacerbation and atrial fibrillation with RVR    Electronically signed by Lukas Sellers MD, 24, 7:59 AM EDT.              If applicable, please update the problem list.   Patient: Jaqui Matute        : 1931  Account: [id number]           Admit Date: 9/15/2024        How to Respond to this query:       a. Click New Note     b. Answer query within the yellow box.                c. Update the Problem List, if applicable.    If you have any questions about this query contact me at: [email or web address]     Dr. Sellers,    89 yo female admitted per H&P dated 9/15/24 for \"unstable angina, chest pain, and acute congestive heart failure.\"  Risk Factors: PMH includes HTN, AAA, and atrial fibrillation. Recent fall with multiple fractures, abdominal wall seroma and deep abscess in right pelvic region on IV Zosyn for a total of 6 weeks at time of admit.  Clinical indicators: Troponin T lab values reported as follows: (9/15/24 @ 18:23) 62 and (9/15/24 @ 21:42) 54, Delta -8.  Cardiology consulted, 24 \"atrial fibrillation with RVR and heart failure exacerbation.\"  Treatment: IV Lasix, IV Lopressor (prn), echo, and planned thoracentesis (not completed). Transitioned to comfort care.    After study, please clarify underlying etiology(ies) of chest pain as:    Unstable angina   Type II MI (please list underlying etiology(ies)____________  Other (please specify)_____________    By submitting this query, we are merely seeking further clarification of documentation to accurately reflect all conditions that you are monitoring, evaluating, treating or that extend the hospitalization or utilize additional resources of care. Please utilize your independent clinical judgment when addressing the question(s) above.     This query and your response, once completed, will be entered into the legal medical " record.    Sincerely,  Florecita HYDE RN CCDS  System Director Clinical Documentation Integrity Program

## 2024-09-21 NOTE — DISCHARGE SUMMARY
[facility] Medicine Services  DISCHARGE SUMMARY    Patient Name: Jaqui Matute  : 1931  MRN: [id number]    Date of Admission: 9/15/2024  5:26 PM  Date of Discharge:  24  Primary Care Physician: Amrit Arredondo APRN    Consults       Date and Time Order Name Status Description    2024 12:03 PM Inpatient Palliative Care MD Consult Completed     2024 12:02 PM Inpatient Palliative Care MD Consult Completed     9/15/2024 10:39 PM Inpatient Cardiology Consult Completed             Hospital Course     Presenting Problem: heart failure    Active Hospital Problems    Diagnosis  POA    **Unstable angina [I20.0]  Yes    Severe malnutrition [E43]  Yes    Anemia [D64.9]  Yes    Acute congestive heart failure [I50.9]  Yes    Bilateral pleural effusion [J90]  Yes    Aortic aneurysm [I71.9]  Yes    Acquired hypothyroidism [E03.9]  Yes    Atrial fibrillation [I48.91]  Yes    Dementia [F03.90]  Yes    HTN (hypertension) [I10]  Yes    Anxiety [F41.9]  Yes    Hyperlipidemia [E78.5]  Yes    GERD without esophagitis [K21.9]  Yes      Resolved Hospital Problems   No resolved problems to display.          Hospital Course:  Jaqui Matute was a 89 y.o. female with h/o PAF, Dementia, HTN, HLD, Hypothyroid, OA, GERD, DDD, HH, AAA, depression, breast cancer s/p left mastectomy, and recent fall with polytrauma including right acetabular fracture, multiple pelvic fractures and acute versus chronic compression fractures of the vertebrae in 2024 with readmission to  in 2024 for abdominal wall seroma/pelvic hardware infection who presented to [facility] with progressively worsening shortness of breath and chest pain.     Chest pain  Acute on chronic heart failure with reduced ejection fraction  Acute respiratory failure with hypercarbia and hypoxia  PEA arrest  - EF 41-45% with mild concentric hypertrophy  - patient seen and evaluated by Cardiology with IV diuresis  undertaken  - CT imaging with runoff planned to further investigate ongoing cyanosis of the patient's fingers and toes  - PEA arrest occurred during CT imaging -- CODE Blue called with subsequent ROSC  - subsequently reviewed care options with daughter at bedside.  Ms Matute at that time appeared quite ill and further medical interventions appeared unlikely to have beneficial effect.  Daughter agreeable to BiPAP in the interim, however was interested in comfort measures.  Palliative Care consulted, with support for the patient and family provided.  The patient's son arrived at bedside, and Ms Matute  at 1800 on 2024.     Atrial fibrillation with RVR  - not anticoagulated due to fall risk  - metoprolol provided for rate control, with digoxin also provided     Pleural effusions  - bilateral     HTN     HLD    Abdominal wall seroma  Pelvic surgical site infection with hardware  - recently followed at  by ID, cultures grew E faecalis and K variicoa (I&D 8/10/24)   - zosyn continued while hospitalized in keeping with the patient's original treatment plan, with planned transition to oral antibiotics (lifelong) for suppression  - PICC care performed while hospitalized  - CT imaging raised concerns for small area of developing abscess, however prior imaging not available for direct comparison of previous fluid collections     Recent fall with multiple fractures  -PT/OT     Hypernatremia  - sodium 152-->148-->152  - suspect poor po intake  - Nutrition consulted    Goals of care  - goals of care discussions begun before patient's PEA arrest given her multiple medical conditions and underlying frailty.         Pertinent  and/or Most Recent Results     LAB RESULTS:      Lab 24  0344 24  0803 09/15/24  1753   WBC 11.80* 11.42* 10.41   HEMOGLOBIN 11.2* 11.4* 11.0*   HEMATOCRIT 37.6 37.9 36.4   PLATELETS 221 203 218   NEUTROS ABS  --  8.39* 8.00*   IMMATURE GRANS (ABS)  --  0.10* 0.08*   LYMPHS ABS  --   0.75 0.64*   MONOS ABS  --  1.31* 0.96*   EOS ABS  --  0.75* 0.63*   .0* 104.1* 104.6*   LACTATE 2.2*  --   --    PROTIME  --  16.0*  --          Lab 09/17/24  0345 09/16/24  0803 09/15/24  1823   SODIUM 151* 148* 152*   POTASSIUM 4.2 4.7 4.5   CHLORIDE 103 105 108*   CO2 34.0* 33.0* 33.0*   ANION GAP 14.0 10.0 11.0   BUN 12 13 14   CREATININE 0.88 0.97 0.93   EGFR 61.4 54.6* 57.4*   GLUCOSE 97 94 112*   CALCIUM 8.3 8.4 8.5   MAGNESIUM  --   --  1.6*   TSH  --   --  3.290         Lab 09/17/24  0345 09/16/24  0803 09/15/24  1823   TOTAL PROTEIN 6.2 6.1 6.3   ALBUMIN 3.6 3.5 3.7   GLOBULIN  --  2.6 2.6   ALT (SGPT) 42* 53* 62*   AST (SGOT) 20 27 32   BILIRUBIN 0.3 0.3 0.3   INDIRECT BILIRUBIN 0.1  --   --    BILIRUBIN DIRECT 0.2  --   --    ALK PHOS 193* 190* 191*   LIPASE  --   --  11*         Lab 09/16/24  0803 09/15/24  2142 09/15/24  1823   PROBNP  --   --  5,163.0*   HSTROP T  --  54* 62*   PROTIME 16.0*  --   --    INR 1.27*  --   --              Lab 09/15/24  2142 09/15/24  1823   IRON  --  21*   IRON SATURATION (TSAT)  --  6*   TIBC  --  332   TRANSFERRIN  --  223   FERRITIN  --  96.49   FOLATE 12.50  --    VITAMIN B 12 1,539*  --          Lab 09/17/24  1212   PH, ARTERIAL 7.101*   PO2 ART 50.3*   FIO2 100   HCO3 ART 35.7*   BASE EXCESS ART 3.0*   CARBOXYHEMOGLOBIN 1.5     Brief Urine Lab Results  (Last result in the past 365 days)        Color   Clarity   Blood   Leuk Est   Nitrite   Protein   CREAT   Urine HCG        07/11/24 0430 Dark Yellow   Clear     Trace                     Microbiology Results (last 10 days)       Procedure Component Value - Date/Time    Blood Culture - Blood, Blood, Central Line [[id number]]  (Normal) Collected: 09/17/24 0345    Lab Status: Preliminary result Specimen: Blood, Central Line Updated: 09/21/24 0430     Blood Culture No growth at 4 days    Blood Culture - Blood, Hand, Left [[id number]]  (Normal) Collected: 09/17/24 0339    Lab Status: Preliminary result Specimen:  Blood from Hand, Left Updated: 09/21/24 0430     Blood Culture No growth at 4 days    Narrative:      Aerobic Bottle Only      Blood Culture - Blood, Arm, Left [[id number]]  (Normal) Collected: 09/17/24 0336    Lab Status: Preliminary result Specimen: Blood from Arm, Left Updated: 09/21/24 0430     Blood Culture No growth at 4 days    Narrative:      Aerobic Bottle Only              CT Angio Abdominal Aorta Bilateral Iliofem Runoff    Result Date: 9/17/2024  CT ANGIO ABDOMINAL AORTA BILAT ILIOFEM RUNOFF Date of Exam: 9/17/2024 11:27 AM EDT Indication: cool right foot.  recent hip surgery. Comparison: CT abdomen and pelvis 8/8/2024 Technique: CTA of the abdomen, pelvis and both lower extremities was performed after the uneventful intravenous administration of 145 cc Isovue-370. Reconstructed coronal and sagittal images were also obtained. In addition, a 3-D volume rendered image was created for interpretation. Automated exposure control and iterative reconstruction methods were used. Findings: AORTA:  Normal in caliber throughout. No dissection or penetrating ulcer identified.  There is moderate to advanced atherosclerotic disease throughout. MESENTERIC/RENAL VESSELS:  Normal in caliber. No dissection or significant stenosis identified. PELVIC VESSELS: Normal in caliber. No dissection or significant stenosis identified. IVC:  Normal caliber. RIGHT LOWER EXTREMITY: There is moderate superficial femoral artery atherosclerotic disease with areas of up to 50% stenosis. The anterior tibial and peroneal arteries are patent at the level of the ankle. The posterior tibial artery is occluded proximally. LEFT LOWER EXTREMITY: There is moderate superficial femoral artery atherosclerotic disease with areas of up to 50% stenosis. The anterior tibial and peroneal artery are patent at the level of the ankle. Posterior tibial artery is occluded proximally. LOWER THORAX: Bilateral lower lung airspace disease with small to moderate  effusions. Correlate for signs of pneumonia. The heart is enlarged. There is reflux of contrast into the IVC and hepatic veins consistent with an element of right heart failure. LIVER: Unremarkable parenchyma without focal lesion. BILIARY/GALLBLADDER: Cholecystectomy SPLEEN: Stable peripherally calcified splenic lesion. PANCREAS:  Unremarkable ADRENAL:  Unremarkable KIDNEYS:  Unremarkable parenchyma with no solid mass identified. No obstruction.  No calculus identified. GASTROINTESTINAL/MESENTERY: There is a sliding hiatal hernia. There is descending and sigmoid diverticulosis without evidence of acute diverticulitis. No evidence of obstruction nor inflammation.  RETROPERITONEUM/LYMPH NODES:  Unremarkable REPRODUCTIVE: Hysterectomy BLADDER:  Unremarkable OSSEUS STRUCTURES: Old fractures as detailed above. No acute osseous process is identified. There is generalized subcutaneous edema throughout the abdominal pelvic wall and the lower extremities. Within the prepubic subcutaneous tissues there has been decrease in size of a deep subcutaneous fluid collection now measuring 1.2 cm in AP diameter (image 122, series 4), previously 3.6 cm. However, there is a single dot of gas within the medial margin of this deep subcutaneous fluid collection suggesting the possibility of a developing abscess. However, no significant surrounding inflammatory changes are noted. Correlate with symptoms and history. This extends to the outer margin of the right iliac crest.     Impression: 1.Moderate to advanced atherosclerotic disease. No evidence of greater than 70% stenosis above the knees. 2.Bilateral two-vessel runoff. Each posterior tibial artery is occluded proximally. 3.Smaller subcutaneous fluid collection within the right anterior lower pelvic region. However there is a new single dot of intraluminal gas suggesting this may be a developing abscess. No surrounding inflammatory changes. Correlate with symptoms. 4.Evidence of right  heart failure. There are bilateral pleural effusions with lower lung consolidation. Correlate for signs of pneumonia. 5.Other stable chronic findings. Electronically Signed: Itz Chavez MD  9/17/2024 12:43 PM EDT  Workstation ID: [id number]    Duplex Venous Lower Extremity - Bilateral CAR    Result Date: 9/16/2024    Technically difficult study.   No evidence of DVT bilaterally     Adult Transthoracic Echo Complete w/ Color, Spectral and Contrast if necessary per protocol    Result Date: 9/16/2024    Left ventricular systolic function is mildly decreased. Calculated left ventricular EF = 42.8% Left ventricular ejection fraction appears to be 41 - 45%.   Left ventricular wall thickness is consistent with mild concentric hypertrophy.   The left atrial cavity is dilated.   Left atrial volume is severely increased.   There is calcification of the aortic valve.   The mitral valve peak gradient is 10 mmHg. The mitral valve mean gradient is 6 mmHg.   Moderate to severe tricuspid valve regurgitation is present.   Estimated right ventricular systolic pressure from tricuspid regurgitation is markedly elevated (>55 mmHg). Calculated right ventricular systolic pressure from tricuspid regurgitation is 65 mmHg.   The aortic root measures 2.7 cm. Mild dilation of the ascending aorta is present.   There is a left pleural effusion. There is a right pleural effusion.     CT Angiogram Chest    Result Date: 9/15/2024  CT ANGIOGRAM CHEST Date of Exam: 9/15/2024 7:34 PM EDT Indication: Chest pain with history of ascending aortic aneurysm. Comparison: CT abdomen pelvis 8/8/2024 Technique: CTA of the chest was performed after the uneventful intravenous administration of Isovue contrast. Reconstructed coronal and sagittal images were also obtained. In addition, a 3-D volume rendered image was created for interpretation. Automated  exposure control and iterative reconstruction methods were used. Findings: Study significantly limited by  motion. Heart size appears mildly enlarged accentuated by low lung volumes and displacement secondary to a moderate size hiatal hernia. No suspicious pericardial effusion. Evaluation of the aorta is slightly limited particularly in the descending aorta by phase of contrast enhancement. The ascending aorta demonstrates no evidence of dissection. The ascending aorta measures up to 3.8 cm. There is some tortuosity of the aortic arch. Aortic arch measures up to 3.6 cm in diameter. Atherosclerotic changes are noted of the aortic arch and origin of the great vessels. Some limitation secondary to streak artifact from contrast bolus in the left upper extremity noted. No obvious evidence of significant stenosis or occlusion of the great vessels. No evidence of dissection of the aortic arch. Evaluation of the descending aorta somewhat limited by the phase of contrast enhancement with no obvious acute abnormality appreciated. Atherosclerotic changes are noted. No aneurysmal dilation noted. The ascending aorta measures up to 2.8 cm in diameter. The pulmonary arterial system appears well opacified demonstrates no evidence of filling defect. Main pulmonary artery is normal in size measuring 3.3 cm. Evaluation of the hilar structures is limited by motion as well as consolidation and pleural effusions. No significant hilar or mediastinal adenopathy is appreciated. The esophagus appears to be grossly unremarkable. There are moderate to large bilateral pleural effusions which have increased in size from comparison CT abdomen/pelvis particularly on the right.. There is a moderate size hiatal hernia. Limited imaging of the upper abdomen demonstrates a peripherally calcified cyst within the spleen. Lung volumes are low. Central airways are grossly patent. Motion limited imaging of the lung parenchyma demonstrates dependent opacity compatible with atelectasis and/or edema. Multilevel degenerative change of the spine. Compression  deformities of midthoracic vertebral bodies appear similar to remote two-view chest from 2023. No definite acute osseous abnormality noted given limitations of the study     Impression: 1. Significantly motion compromised exam 2. Mild dilation of the ascending aorta measuring less than 4 cm. No evidence of dissection noted on limited imaging of the ascending aorta or aortic arch. Evaluation of the descending aorta is limited by phase of contrast enhancement. 3. Moderate to large bilateral pleural effusions and associated dependent consolidation likely atelectasis. Component of pulmonary edema also question. Pneumonia cannot be excluded. 4. Cardiomegaly accentuated by low lung volumes. 5. Other findings as above Electronically Signed: Fausto Beverly MD  9/15/2024 8:15 PM EDT  Workstation ID: [id number]    XR Chest 1 View    Result Date: 9/15/2024  XR CHEST 1 VW Date of Exam: 9/15/2024 5:45 PM EDT Indication: Chest Pain Triage Protocol Comparison: 7/10/2024 Findings: Right arm PICC terminates overlying the superior cavoatrial junction. Cardiac silhouette is enlarged. Pulmonary vasculature is indistinct. There are small bilateral pleural effusions and bibasilar atelectasis, edema, or infiltrates. No pneumothorax is seen. No acute osseous lesion is seen. There are senescent changes of the aorta and skeletal structures. Probable kyphoplasty changes within the upper thoracic spine. Surgical clips within the left axilla.     Impression: 1.Mild CHF with small bilateral pleural effusions and bibasilar atelectasis, edema, or infiltrates. Electronically Signed: Parish Lamar MD  9/15/2024 6:10 PM EDT  Workstation ID: [id number]     Results for orders placed during the hospital encounter of 09/15/24    Duplex Venous Lower Extremity - Bilateral CAR    Interpretation Summary    Technically difficult study.    No evidence of DVT bilaterally      Results for orders placed during the hospital encounter of 09/15/24    Duplex Venous  Lower Extremity - Bilateral CAR    Interpretation Summary    Technically difficult study.    No evidence of DVT bilaterally      Results for orders placed during the hospital encounter of 09/15/24    Adult Transthoracic Echo Complete w/ Color, Spectral and Contrast if necessary per protocol    Interpretation Summary    Left ventricular systolic function is mildly decreased. Calculated left ventricular EF = 42.8% Left ventricular ejection fraction appears to be 41 - 45%.    Left ventricular wall thickness is consistent with mild concentric hypertrophy.    The left atrial cavity is dilated.    Left atrial volume is severely increased.    There is calcification of the aortic valve.    The mitral valve peak gradient is 10 mmHg. The mitral valve mean gradient is 6 mmHg.    Moderate to severe tricuspid valve regurgitation is present.    Estimated right ventricular systolic pressure from tricuspid regurgitation is markedly elevated (>55 mmHg). Calculated right ventricular systolic pressure from tricuspid regurgitation is 65 mmHg.    The aortic root measures 2.7 cm. Mild dilation of the ascending aorta is present.    There is a left pleural effusion. There is a right pleural effusion.      Plan for Follow-up of Pending Labs/Results:   Pending Labs       Order Current Status    Blood Culture - Blood, Arm, Left Preliminary result    Blood Culture - Blood, Blood, Central Line Preliminary result    Blood Culture - Blood, Hand, Left Preliminary result          Discharge Details        Discharge Medications        ASK your doctor about these medications        Instructions Start Date   acetaminophen 325 MG tablet  Commonly known as: TYLENOL   650 mg, Oral, Every 6 Hours PRN      calcium carbonate 500 MG chewable tablet  Commonly known as: TUMS   2 tablets, Oral, Daily, With a meal       fluconazole 150 MG tablet  Commonly known as: DIFLUCAN   150 mg, Oral, For 3 doses, take 1 dose every 72 hrs. Last dose 09-13 last dose  24      furosemide 20 MG tablet  Commonly known as: LASIX   20 mg, Oral, Daily      hydrALAZINE 10 MG tablet  Commonly known as: APRESOLINE   10 mg, Oral, 2 Times Daily PRN, SBP>170 and DBP>90      ipratropium-albuterol 0.5-2.5 mg/3 ml nebulizer  Commonly known as: DUO-NEB   3 mL, Nebulization, Every 6 Hours PRN      levothyroxine 125 MCG tablet  Commonly known as: SYNTHROID, LEVOTHROID   125 mcg, Oral, Daily      melatonin 5 MG tablet tablet   5 mg, Oral, Every Night at Bedtime      memantine 5 MG tablet  Commonly known as: NAMENDA   5 mg, Oral, 2 Times Daily      methocarbamol 750 MG tablet  Commonly known as: ROBAXIN   750 mg, Oral, Every 6 Hours PRN      MULTIVITAMIN ADULT EXTRA C PO   Take 1 tablet by mouth Daily. Hair-Skin- Nails      nystatin 100,000 unit/mL suspension  Commonly known as: MYCOSTATIN   500,000 Units, Swish & Swallow, 3 times daily, For 10 days, started on 09-10-24      omeprazole 20 MG capsule  Commonly known as: priLOSEC   20 mg, Oral, Daily      oxybutynin XL 10 MG 24 hr tablet  Commonly known as: DITROPAN-XL   10 mg, Oral, Daily      polyethylene glycol 17 g packet  Commonly known as: MIRALAX   17 g, Oral, Daily      potassium chloride 10 MEQ CR tablet  Commonly known as: KLOR-CON M10   40 mEq, Oral, Daily      saccharomyces boulardii 250 MG capsule  Commonly known as: FLORASTOR   250 mg, Oral, 2 Times Daily      sertraline 100 MG tablet  Commonly known as: ZOLOFT   100 mg, Oral, Daily      sodium chloride 0.9 % solution 100 mL with piperacillin-tazobactam 3.375 (3-0.375) g reconstituted solution 3.375 g IVPB   3.375 g, Intravenous, Every 6 Hours, Run time 1 hour       verapamil  MG CR tablet  Commonly known as: CALAN-SR   240 mg, Oral, Daily      Vitamin D3 50 MCG ( UT) tablet   2,000 Units, Oral, Daily               No Known Allergies      Discharge Disposition:   in Medical Facility    Diet:  Hospital:No active diet order           Activity:      Restrictions or  Other Recommendations:         CODE STATUS:    Code Status and Medical Interventions: No CPR (Do Not Attempt to Resuscitate); Comfort Measures   Ordered at: 09/17/24 1225     Level Of Support Discussed With:    Next of Kin (If No Surrogate)    Health Care Surrogate     Code Status (Patient has no pulse and is not breathing):    No CPR (Do Not Attempt to Resuscitate)     Medical Interventions (Patient has pulse or is breathing):    Comfort Measures       No future appointments.              Dragan Trinidad MD  09/21/24

## 2024-09-22 LAB
BACTERIA SPEC AEROBE CULT: NORMAL

## 2024-09-27 NOTE — PROGRESS NOTES
"Enter Query Response Below      Query Response: cardiac arrest due to heart failure  Electronically signed by Dragan Trinidad MD, 24, 8:07 AM EDT.               If applicable, please update the problem list.   Patient: Jaqui Matute        : 1931  Account: [id number]           Admit Date: 9/15/2024        How to Respond to this query:       a. Click New Note     b. Answer query within the yellow box.                c. Update the Problem List, if applicable.    If you have any questions about this query contact me at: [email or web address]     Dr. Trinidad,     89 yo female admitted per H&P dated 9/15/24 for \"unstable angina, chest pain, and acute congestive heart failure.\"  Risk Factors: PMH includes HTN, AAA, and atrial fibrillation. Recent fall with multiple fractures, abdominal wall seroma and deep abscess in right pelvic region on IV Zosyn for a total of 6 weeks at time of admit.  Clinical indicators: Progress notes 24 \"Code blue called in CT scan this morning for PEA with CPR initiated and ROSC.  According to staff, patient experienced decreased heart rate after laying down on the CT table for imaging. Acute respiratory failure with hypercarbia and hypoxia.\"  Dr. Sellers response to CDI query \"Type II MI secondary to systolic heart failure exacerbation and atrial fibrillation with RVR.\"   Treatment: BiPAP and transition to comfort care.  [place] further specify the cause for the patient’s cardiac arrest as:    Cardiac arrest due to (please list diagnosis)   Cardiac arrest due to (please list cardiac Diagnosis)   Cardiac arrest of unknown cause    Other- specify____________    By submitting this query, we are merely seeking further clarification of documentation to accurately reflect all conditions that you are monitoring, evaluating, treating or that extend the hospitalization or utilize additional resources of care. Please utilize your independent clinical judgment when addressing the " question(s) above.     This query and your response, once completed, will be entered into the legal medical record.    Sincerely,  Florecita HYDE RN CCDS  System Director Clinical Documentation Integrity Program